# Patient Record
Sex: FEMALE | Race: WHITE | NOT HISPANIC OR LATINO | Employment: OTHER | URBAN - METROPOLITAN AREA
[De-identification: names, ages, dates, MRNs, and addresses within clinical notes are randomized per-mention and may not be internally consistent; named-entity substitution may affect disease eponyms.]

---

## 2017-05-16 ENCOUNTER — HOSPITAL ENCOUNTER (INPATIENT)
Facility: HOSPITAL | Age: 62
LOS: 3 days | DRG: 917 | End: 2017-05-19
Attending: EMERGENCY MEDICINE | Admitting: INTERNAL MEDICINE
Payer: MEDICARE

## 2017-05-16 DIAGNOSIS — T50.902A INTENTIONAL OVERDOSE OF DRUG IN TABLET FORM (HCC): Primary | ICD-10-CM

## 2017-05-16 DIAGNOSIS — R74.8 ELEVATED CK: ICD-10-CM

## 2017-05-16 DIAGNOSIS — R00.0 SINUS TACHYCARDIA: ICD-10-CM

## 2017-05-16 DIAGNOSIS — R41.82 ALTERED MENTAL STATUS: ICD-10-CM

## 2017-05-16 PROBLEM — E87.2 LACTIC ACIDOSIS: Status: ACTIVE | Noted: 2017-05-16

## 2017-05-16 PROBLEM — E87.20 LACTIC ACIDOSIS: Status: ACTIVE | Noted: 2017-05-16

## 2017-05-16 PROBLEM — F32.A DEPRESSION: Status: ACTIVE | Noted: 2017-05-16

## 2017-05-16 LAB
ALBUMIN SERPL BCP-MCNC: 3.9 G/DL (ref 3.5–5)
ALP SERPL-CCNC: 76 U/L (ref 46–116)
ALT SERPL W P-5'-P-CCNC: 32 U/L (ref 12–78)
AMMONIA PLAS-SCNC: 34 UMOL/L (ref 11–35)
ANION GAP SERPL CALCULATED.3IONS-SCNC: 8 MMOL/L (ref 4–13)
APAP SERPL-MCNC: <2 UG/ML (ref 10–30)
AST SERPL W P-5'-P-CCNC: 37 U/L (ref 5–45)
BASOPHILS # BLD AUTO: 0.01 THOUSANDS/ΜL (ref 0–0.1)
BASOPHILS NFR BLD AUTO: 0 % (ref 0–1)
BILIRUB SERPL-MCNC: 0.61 MG/DL (ref 0.2–1)
BUN SERPL-MCNC: 16 MG/DL (ref 5–25)
CALCIUM SERPL-MCNC: 9.6 MG/DL (ref 8.3–10.1)
CHLORIDE SERPL-SCNC: 101 MMOL/L (ref 100–108)
CK MB SERPL-MCNC: 16.8 NG/ML (ref 0–5)
CK MB SERPL-MCNC: 3.1 % (ref 0–2.5)
CK SERPL-CCNC: 538 U/L (ref 26–192)
CO2 SERPL-SCNC: 30 MMOL/L (ref 21–32)
CREAT SERPL-MCNC: 0.68 MG/DL (ref 0.6–1.3)
EOSINOPHIL # BLD AUTO: 0.01 THOUSAND/ΜL (ref 0–0.61)
EOSINOPHIL NFR BLD AUTO: 0 % (ref 0–6)
ERYTHROCYTE [DISTWIDTH] IN BLOOD BY AUTOMATED COUNT: 13 % (ref 11.6–15.1)
ETHANOL SERPL-MCNC: <3 MG/DL (ref 0–3)
GFR SERPL CREATININE-BSD FRML MDRD: >60 ML/MIN/1.73SQ M
GLUCOSE SERPL-MCNC: 115 MG/DL (ref 65–140)
HCT VFR BLD AUTO: 42.3 % (ref 34.8–46.1)
HGB BLD-MCNC: 14.5 G/DL (ref 11.5–15.4)
LACTATE SERPL-SCNC: 2.8 MMOL/L (ref 0.5–2)
LYMPHOCYTES # BLD AUTO: 0.76 THOUSANDS/ΜL (ref 0.6–4.47)
LYMPHOCYTES NFR BLD AUTO: 9 % (ref 14–44)
MCH RBC QN AUTO: 31.7 PG (ref 26.8–34.3)
MCHC RBC AUTO-ENTMCNC: 34.3 G/DL (ref 31.4–37.4)
MCV RBC AUTO: 93 FL (ref 82–98)
MONOCYTES # BLD AUTO: 0.78 THOUSAND/ΜL (ref 0.17–1.22)
MONOCYTES NFR BLD AUTO: 9 % (ref 4–12)
NEUTROPHILS # BLD AUTO: 7.19 THOUSANDS/ΜL (ref 1.85–7.62)
NEUTS SEG NFR BLD AUTO: 82 % (ref 43–75)
NRBC BLD AUTO-RTO: 0 /100 WBCS
PLATELET # BLD AUTO: 238 THOUSANDS/UL (ref 149–390)
PMV BLD AUTO: 11.8 FL (ref 8.9–12.7)
POTASSIUM SERPL-SCNC: 4.2 MMOL/L (ref 3.5–5.3)
PROT SERPL-MCNC: 7.2 G/DL (ref 6.4–8.2)
RBC # BLD AUTO: 4.57 MILLION/UL (ref 3.81–5.12)
SALICYLATES SERPL-MCNC: <3 MG/DL (ref 3–20)
SODIUM SERPL-SCNC: 139 MMOL/L (ref 136–145)
TSH SERPL DL<=0.05 MIU/L-ACNC: 5.72 UIU/ML (ref 0.36–3.74)
WBC # BLD AUTO: 8.77 THOUSAND/UL (ref 4.31–10.16)

## 2017-05-16 PROCEDURE — 82550 ASSAY OF CK (CPK): CPT | Performed by: EMERGENCY MEDICINE

## 2017-05-16 PROCEDURE — 80053 COMPREHEN METABOLIC PANEL: CPT | Performed by: EMERGENCY MEDICINE

## 2017-05-16 PROCEDURE — 84443 ASSAY THYROID STIM HORMONE: CPT | Performed by: EMERGENCY MEDICINE

## 2017-05-16 PROCEDURE — 96360 HYDRATION IV INFUSION INIT: CPT

## 2017-05-16 PROCEDURE — 82140 ASSAY OF AMMONIA: CPT | Performed by: EMERGENCY MEDICINE

## 2017-05-16 PROCEDURE — 80329 ANALGESICS NON-OPIOID 1 OR 2: CPT | Performed by: EMERGENCY MEDICINE

## 2017-05-16 PROCEDURE — 83605 ASSAY OF LACTIC ACID: CPT | Performed by: EMERGENCY MEDICINE

## 2017-05-16 PROCEDURE — 93005 ELECTROCARDIOGRAM TRACING: CPT

## 2017-05-16 PROCEDURE — 80320 DRUG SCREEN QUANTALCOHOLS: CPT | Performed by: EMERGENCY MEDICINE

## 2017-05-16 PROCEDURE — 82553 CREATINE MB FRACTION: CPT | Performed by: EMERGENCY MEDICINE

## 2017-05-16 PROCEDURE — 36415 COLL VENOUS BLD VENIPUNCTURE: CPT | Performed by: EMERGENCY MEDICINE

## 2017-05-16 PROCEDURE — 85025 COMPLETE CBC W/AUTO DIFF WBC: CPT | Performed by: EMERGENCY MEDICINE

## 2017-05-16 RX ORDER — TEMAZEPAM 15 MG/1
15 CAPSULE ORAL
COMMUNITY
End: 2017-06-01 | Stop reason: HOSPADM

## 2017-05-16 RX ORDER — TRAZODONE HYDROCHLORIDE 100 MG/1
100 TABLET ORAL
Status: ON HOLD | COMMUNITY
End: 2017-06-01

## 2017-05-16 RX ORDER — VENLAFAXINE 37.5 MG/1
37.5 TABLET ORAL 3 TIMES DAILY
COMMUNITY
End: 2017-06-01 | Stop reason: HOSPADM

## 2017-05-16 RX ORDER — ONDANSETRON 2 MG/ML
4 INJECTION INTRAMUSCULAR; INTRAVENOUS EVERY 6 HOURS PRN
Status: DISCONTINUED | OUTPATIENT
Start: 2017-05-16 | End: 2017-05-19 | Stop reason: HOSPADM

## 2017-05-16 RX ORDER — SODIUM CHLORIDE 9 MG/ML
100 INJECTION, SOLUTION INTRAVENOUS CONTINUOUS
Status: DISCONTINUED | OUTPATIENT
Start: 2017-05-16 | End: 2017-05-18

## 2017-05-16 RX ORDER — LEVOTHYROXINE SODIUM 0.05 MG/1
50 TABLET ORAL
Status: DISCONTINUED | OUTPATIENT
Start: 2017-05-17 | End: 2017-05-19 | Stop reason: HOSPADM

## 2017-05-16 RX ORDER — LORAZEPAM 0.5 MG/1
0.5 TABLET ORAL 2 TIMES DAILY PRN
COMMUNITY
End: 2017-06-01 | Stop reason: HOSPADM

## 2017-05-16 RX ORDER — LEVOTHYROXINE SODIUM 0.05 MG/1
50 TABLET ORAL DAILY
Status: ON HOLD | COMMUNITY
End: 2018-03-12

## 2017-05-16 RX ADMIN — SODIUM CHLORIDE 100 ML/HR: 0.9 INJECTION, SOLUTION INTRAVENOUS at 21:55

## 2017-05-16 RX ADMIN — SODIUM CHLORIDE 1000 ML: 0.9 INJECTION, SOLUTION INTRAVENOUS at 16:29

## 2017-05-17 LAB
AMPHETAMINES SERPL QL SCN: NEGATIVE
ANION GAP SERPL CALCULATED.3IONS-SCNC: 7 MMOL/L (ref 4–13)
ATRIAL RATE: 102 BPM
BARBITURATES UR QL: NEGATIVE
BENZODIAZ UR QL: POSITIVE
BUN SERPL-MCNC: 13 MG/DL (ref 5–25)
CALCIUM SERPL-MCNC: 8.2 MG/DL (ref 8.3–10.1)
CHLORIDE SERPL-SCNC: 107 MMOL/L (ref 100–108)
CO2 SERPL-SCNC: 29 MMOL/L (ref 21–32)
COCAINE UR QL: NEGATIVE
CREAT SERPL-MCNC: 0.54 MG/DL (ref 0.6–1.3)
ERYTHROCYTE [DISTWIDTH] IN BLOOD BY AUTOMATED COUNT: 12.9 % (ref 11.6–15.1)
GFR SERPL CREATININE-BSD FRML MDRD: >60 ML/MIN/1.73SQ M
GLUCOSE SERPL-MCNC: 80 MG/DL (ref 65–140)
HCT VFR BLD AUTO: 36.5 % (ref 34.8–46.1)
HGB BLD-MCNC: 12.2 G/DL (ref 11.5–15.4)
MCH RBC QN AUTO: 30.9 PG (ref 26.8–34.3)
MCHC RBC AUTO-ENTMCNC: 33.4 G/DL (ref 31.4–37.4)
MCV RBC AUTO: 92 FL (ref 82–98)
METHADONE UR QL: NEGATIVE
OPIATES UR QL SCN: NEGATIVE
P AXIS: 36 DEGREES
PCP UR QL: NEGATIVE
PLATELET # BLD AUTO: 183 THOUSANDS/UL (ref 149–390)
PMV BLD AUTO: 11.7 FL (ref 8.9–12.7)
POTASSIUM SERPL-SCNC: 3.5 MMOL/L (ref 3.5–5.3)
PR INTERVAL: 176 MS
QRS AXIS: 17 DEGREES
QRSD INTERVAL: 84 MS
QT INTERVAL: 310 MS
QTC INTERVAL: 404 MS
RBC # BLD AUTO: 3.95 MILLION/UL (ref 3.81–5.12)
SODIUM SERPL-SCNC: 143 MMOL/L (ref 136–145)
T WAVE AXIS: 55 DEGREES
THC UR QL: NEGATIVE
VENTRICULAR RATE: 102 BPM
WBC # BLD AUTO: 6.68 THOUSAND/UL (ref 4.31–10.16)

## 2017-05-17 PROCEDURE — 36415 COLL VENOUS BLD VENIPUNCTURE: CPT | Performed by: FAMILY MEDICINE

## 2017-05-17 PROCEDURE — 99285 EMERGENCY DEPT VISIT HI MDM: CPT

## 2017-05-17 PROCEDURE — 80307 DRUG TEST PRSMV CHEM ANLYZR: CPT | Performed by: EMERGENCY MEDICINE

## 2017-05-17 PROCEDURE — 85027 COMPLETE CBC AUTOMATED: CPT | Performed by: FAMILY MEDICINE

## 2017-05-17 PROCEDURE — 80048 BASIC METABOLIC PNL TOTAL CA: CPT | Performed by: FAMILY MEDICINE

## 2017-05-17 RX ADMIN — ENOXAPARIN SODIUM 40 MG: 40 INJECTION SUBCUTANEOUS at 09:27

## 2017-05-17 RX ADMIN — LEVOTHYROXINE SODIUM 50 MCG: 50 TABLET ORAL at 06:04

## 2017-05-18 PROBLEM — F29 PSYCHOSIS (HCC): Status: ACTIVE | Noted: 2017-05-18

## 2017-05-18 RX ORDER — VENLAFAXINE HYDROCHLORIDE 37.5 MG/1
37.5 CAPSULE, EXTENDED RELEASE ORAL 3 TIMES DAILY
Status: DISCONTINUED | OUTPATIENT
Start: 2017-05-18 | End: 2017-05-18

## 2017-05-18 RX ADMIN — LEVOTHYROXINE SODIUM 50 MCG: 50 TABLET ORAL at 07:00

## 2017-05-18 RX ADMIN — ENOXAPARIN SODIUM 40 MG: 40 INJECTION SUBCUTANEOUS at 10:10

## 2017-05-19 ENCOUNTER — HOSPITAL ENCOUNTER (INPATIENT)
Facility: HOSPITAL | Age: 62
LOS: 13 days | Discharge: HOME | DRG: 885 | End: 2017-06-01
Attending: PSYCHIATRY & NEUROLOGY | Admitting: PSYCHIATRY & NEUROLOGY
Payer: MEDICARE

## 2017-05-19 VITALS
WEIGHT: 156.31 LBS | HEART RATE: 73 BPM | OXYGEN SATURATION: 96 % | TEMPERATURE: 98.3 F | HEIGHT: 64 IN | BODY MASS INDEX: 26.69 KG/M2 | DIASTOLIC BLOOD PRESSURE: 60 MMHG | RESPIRATION RATE: 18 BRPM | SYSTOLIC BLOOD PRESSURE: 100 MMHG

## 2017-05-19 DIAGNOSIS — F25.1 SCHIZOAFFECTIVE DISORDER, DEPRESSIVE TYPE (HCC): Chronic | ICD-10-CM

## 2017-05-19 DIAGNOSIS — G47.00 INSOMNIA: Primary | ICD-10-CM

## 2017-05-19 RX ORDER — LORAZEPAM 2 MG/ML
1 INJECTION INTRAMUSCULAR EVERY 4 HOURS PRN
Status: DISCONTINUED | OUTPATIENT
Start: 2017-05-19 | End: 2017-06-01 | Stop reason: HOSPADM

## 2017-05-19 RX ORDER — HALOPERIDOL 1 MG/1
1 TABLET ORAL EVERY 8 HOURS PRN
Status: DISCONTINUED | OUTPATIENT
Start: 2017-05-19 | End: 2017-06-01 | Stop reason: HOSPADM

## 2017-05-19 RX ORDER — LORAZEPAM 0.5 MG/1
0.5 TABLET ORAL 2 TIMES DAILY PRN
Status: DISCONTINUED | OUTPATIENT
Start: 2017-05-19 | End: 2017-05-19

## 2017-05-19 RX ORDER — MAGNESIUM HYDROXIDE/ALUMINUM HYDROXICE/SIMETHICONE 120; 1200; 1200 MG/30ML; MG/30ML; MG/30ML
30 SUSPENSION ORAL EVERY 4 HOURS PRN
Status: DISCONTINUED | OUTPATIENT
Start: 2017-05-19 | End: 2017-06-01 | Stop reason: HOSPADM

## 2017-05-19 RX ORDER — LORAZEPAM 1 MG/1
1 TABLET ORAL EVERY 4 HOURS PRN
Status: DISCONTINUED | OUTPATIENT
Start: 2017-05-19 | End: 2017-06-01 | Stop reason: HOSPADM

## 2017-05-19 RX ORDER — TRAZODONE HYDROCHLORIDE 100 MG/1
100 TABLET ORAL
Status: DISCONTINUED | OUTPATIENT
Start: 2017-05-19 | End: 2017-06-01 | Stop reason: HOSPADM

## 2017-05-19 RX ORDER — TEMAZEPAM 15 MG/1
15 CAPSULE ORAL
Status: DISCONTINUED | OUTPATIENT
Start: 2017-05-19 | End: 2017-06-01 | Stop reason: HOSPADM

## 2017-05-19 RX ORDER — OLANZAPINE 10 MG/1
2.5 INJECTION, POWDER, LYOPHILIZED, FOR SOLUTION INTRAMUSCULAR EVERY 8 HOURS PRN
Status: DISCONTINUED | OUTPATIENT
Start: 2017-05-19 | End: 2017-06-01 | Stop reason: HOSPADM

## 2017-05-19 RX ORDER — VENLAFAXINE 37.5 MG/1
37.5 TABLET ORAL 3 TIMES DAILY
Status: DISCONTINUED | OUTPATIENT
Start: 2017-05-19 | End: 2017-05-23

## 2017-05-19 RX ORDER — LEVOTHYROXINE SODIUM 0.05 MG/1
50 TABLET ORAL
Status: DISCONTINUED | OUTPATIENT
Start: 2017-05-20 | End: 2017-06-01 | Stop reason: HOSPADM

## 2017-05-19 RX ORDER — HALOPERIDOL 5 MG/ML
1 INJECTION INTRAMUSCULAR EVERY 8 HOURS PRN
Status: DISCONTINUED | OUTPATIENT
Start: 2017-05-19 | End: 2017-06-01 | Stop reason: HOSPADM

## 2017-05-19 RX ORDER — OLANZAPINE 5 MG/1
5 TABLET ORAL EVERY 8 HOURS PRN
Status: DISCONTINUED | OUTPATIENT
Start: 2017-05-19 | End: 2017-06-01 | Stop reason: HOSPADM

## 2017-05-19 RX ORDER — RISPERIDONE 1 MG/1
1 TABLET, ORALLY DISINTEGRATING ORAL EVERY 8 HOURS PRN
Status: DISCONTINUED | OUTPATIENT
Start: 2017-05-19 | End: 2017-06-01 | Stop reason: HOSPADM

## 2017-05-19 RX ADMIN — LEVOTHYROXINE SODIUM 50 MCG: 50 TABLET ORAL at 05:37

## 2017-05-19 RX ADMIN — TRAZODONE HYDROCHLORIDE 100 MG: 100 TABLET ORAL at 22:05

## 2017-05-19 RX ADMIN — VENLAFAXINE 37.5 MG: 37.5 TABLET ORAL at 21:03

## 2017-05-20 PROBLEM — F32.3 MAJOR DEPRESSIVE DISORDER WITH PSYCHOTIC FEATURES (HCC): Status: ACTIVE | Noted: 2017-05-20

## 2017-05-20 RX ORDER — QUETIAPINE FUMARATE 100 MG/1
100 TABLET, FILM COATED ORAL 2 TIMES DAILY
Status: DISCONTINUED | OUTPATIENT
Start: 2017-05-20 | End: 2017-05-22

## 2017-05-20 RX ADMIN — VENLAFAXINE 37.5 MG: 37.5 TABLET ORAL at 22:09

## 2017-05-20 RX ADMIN — VENLAFAXINE 37.5 MG: 37.5 TABLET ORAL at 10:48

## 2017-05-20 RX ADMIN — TRAZODONE HYDROCHLORIDE 100 MG: 100 TABLET ORAL at 22:09

## 2017-05-20 RX ADMIN — QUETIAPINE FUMARATE 100 MG: 100 TABLET, FILM COATED ORAL at 18:23

## 2017-05-20 RX ADMIN — LEVOTHYROXINE SODIUM 50 MCG: 50 TABLET ORAL at 06:38

## 2017-05-20 RX ADMIN — VENLAFAXINE 37.5 MG: 37.5 TABLET ORAL at 18:14

## 2017-05-21 RX ADMIN — QUETIAPINE FUMARATE 100 MG: 100 TABLET, FILM COATED ORAL at 08:53

## 2017-05-21 RX ADMIN — TRAZODONE HYDROCHLORIDE 100 MG: 100 TABLET ORAL at 21:17

## 2017-05-21 RX ADMIN — VENLAFAXINE 37.5 MG: 37.5 TABLET ORAL at 08:54

## 2017-05-21 RX ADMIN — QUETIAPINE FUMARATE 100 MG: 100 TABLET, FILM COATED ORAL at 17:21

## 2017-05-21 RX ADMIN — LEVOTHYROXINE SODIUM 50 MCG: 50 TABLET ORAL at 06:26

## 2017-05-21 RX ADMIN — VENLAFAXINE 37.5 MG: 37.5 TABLET ORAL at 21:16

## 2017-05-21 RX ADMIN — VENLAFAXINE 37.5 MG: 37.5 TABLET ORAL at 17:21

## 2017-05-22 RX ORDER — OLANZAPINE 10 MG/1
10 TABLET ORAL
Status: DISCONTINUED | OUTPATIENT
Start: 2017-05-22 | End: 2017-05-23

## 2017-05-22 RX ADMIN — VENLAFAXINE 37.5 MG: 37.5 TABLET ORAL at 16:52

## 2017-05-22 RX ADMIN — TRAZODONE HYDROCHLORIDE 100 MG: 100 TABLET ORAL at 21:02

## 2017-05-22 RX ADMIN — VENLAFAXINE 37.5 MG: 37.5 TABLET ORAL at 21:02

## 2017-05-22 RX ADMIN — LEVOTHYROXINE SODIUM 50 MCG: 50 TABLET ORAL at 05:49

## 2017-05-22 RX ADMIN — QUETIAPINE FUMARATE 100 MG: 100 TABLET, FILM COATED ORAL at 08:16

## 2017-05-22 RX ADMIN — VENLAFAXINE 37.5 MG: 37.5 TABLET ORAL at 08:16

## 2017-05-22 RX ADMIN — OLANZAPINE 10 MG: 10 TABLET, FILM COATED ORAL at 21:02

## 2017-05-23 PROBLEM — F20.0 SCHIZOPHRENIA, PARANOID, CHRONIC WITH ACUTE EXACERBATION (HCC): Chronic | Status: ACTIVE | Noted: 2017-05-20

## 2017-05-23 RX ORDER — VENLAFAXINE 37.5 MG/1
37.5 TABLET ORAL 2 TIMES DAILY WITH MEALS
Status: DISCONTINUED | OUTPATIENT
Start: 2017-05-23 | End: 2017-05-24

## 2017-05-23 RX ADMIN — LORAZEPAM 1 MG: 1 TABLET ORAL at 11:03

## 2017-05-23 RX ADMIN — VENLAFAXINE 37.5 MG: 37.5 TABLET ORAL at 08:57

## 2017-05-23 RX ADMIN — VENLAFAXINE 37.5 MG: 37.5 TABLET ORAL at 15:37

## 2017-05-23 RX ADMIN — LEVOTHYROXINE SODIUM 50 MCG: 50 TABLET ORAL at 05:51

## 2017-05-23 RX ADMIN — TRAZODONE HYDROCHLORIDE 100 MG: 100 TABLET ORAL at 21:29

## 2017-05-23 RX ADMIN — OLANZAPINE 15 MG: 10 TABLET, FILM COATED ORAL at 21:30

## 2017-05-24 RX ORDER — OLANZAPINE 10 MG/1
20 TABLET ORAL
Status: DISCONTINUED | OUTPATIENT
Start: 2017-05-24 | End: 2017-05-25

## 2017-05-24 RX ADMIN — LEVOTHYROXINE SODIUM 50 MCG: 50 TABLET ORAL at 06:02

## 2017-05-24 RX ADMIN — VENLAFAXINE 37.5 MG: 37.5 TABLET ORAL at 08:40

## 2017-05-24 RX ADMIN — OLANZAPINE 20 MG: 10 TABLET, FILM COATED ORAL at 21:36

## 2017-05-24 RX ADMIN — TRAZODONE HYDROCHLORIDE 100 MG: 100 TABLET ORAL at 21:36

## 2017-05-25 RX ORDER — OLANZAPINE 10 MG/1
10 TABLET ORAL 2 TIMES DAILY
Status: DISCONTINUED | OUTPATIENT
Start: 2017-05-25 | End: 2017-05-30

## 2017-05-25 RX ADMIN — LORAZEPAM 1 MG: 1 TABLET ORAL at 12:03

## 2017-05-25 RX ADMIN — LEVOTHYROXINE SODIUM 50 MCG: 50 TABLET ORAL at 06:08

## 2017-05-25 RX ADMIN — OLANZAPINE 10 MG: 10 TABLET, FILM COATED ORAL at 17:19

## 2017-05-25 RX ADMIN — TRAZODONE HYDROCHLORIDE 100 MG: 100 TABLET ORAL at 21:21

## 2017-05-26 PROBLEM — F32.89 ATYPICAL DEPRESSION: Status: ACTIVE | Noted: 2017-05-26

## 2017-05-26 RX ADMIN — OLANZAPINE 10 MG: 10 TABLET, FILM COATED ORAL at 08:42

## 2017-05-26 RX ADMIN — TRAZODONE HYDROCHLORIDE 100 MG: 100 TABLET ORAL at 21:28

## 2017-05-26 RX ADMIN — LORAZEPAM 1 MG: 1 TABLET ORAL at 19:46

## 2017-05-26 RX ADMIN — LEVOTHYROXINE SODIUM 50 MCG: 50 TABLET ORAL at 06:08

## 2017-05-26 RX ADMIN — OLANZAPINE 10 MG: 10 TABLET, FILM COATED ORAL at 17:07

## 2017-05-27 LAB — GLUCOSE SERPL-MCNC: 120 MG/DL (ref 65–140)

## 2017-05-27 PROCEDURE — 82948 REAGENT STRIP/BLOOD GLUCOSE: CPT

## 2017-05-27 RX ORDER — SERTRALINE HYDROCHLORIDE 100 MG/1
100 TABLET, FILM COATED ORAL DAILY
Status: DISCONTINUED | OUTPATIENT
Start: 2017-05-28 | End: 2017-06-01 | Stop reason: HOSPADM

## 2017-05-27 RX ADMIN — LEVOTHYROXINE SODIUM 50 MCG: 50 TABLET ORAL at 05:51

## 2017-05-27 RX ADMIN — OLANZAPINE 10 MG: 10 TABLET, FILM COATED ORAL at 08:39

## 2017-05-27 RX ADMIN — TRAZODONE HYDROCHLORIDE 100 MG: 100 TABLET ORAL at 21:23

## 2017-05-27 RX ADMIN — OLANZAPINE 10 MG: 10 TABLET, FILM COATED ORAL at 17:29

## 2017-05-27 RX ADMIN — SERTRALINE HYDROCHLORIDE 50 MG: 50 TABLET ORAL at 08:39

## 2017-05-28 LAB
CHOLEST SERPL-MCNC: 189 MG/DL (ref 50–200)
HDLC SERPL-MCNC: 41 MG/DL (ref 40–60)
LDLC SERPL CALC-MCNC: 118 MG/DL (ref 0–100)
TRIGL SERPL-MCNC: 152 MG/DL

## 2017-05-28 PROCEDURE — 80061 LIPID PANEL: CPT | Performed by: PSYCHIATRY & NEUROLOGY

## 2017-05-28 RX ADMIN — OLANZAPINE 10 MG: 10 TABLET, FILM COATED ORAL at 08:13

## 2017-05-28 RX ADMIN — SERTRALINE HYDROCHLORIDE 100 MG: 100 TABLET, FILM COATED ORAL at 08:13

## 2017-05-28 RX ADMIN — TRAZODONE HYDROCHLORIDE 100 MG: 100 TABLET ORAL at 21:12

## 2017-05-28 RX ADMIN — LEVOTHYROXINE SODIUM 50 MCG: 50 TABLET ORAL at 06:29

## 2017-05-28 RX ADMIN — OLANZAPINE 10 MG: 10 TABLET, FILM COATED ORAL at 17:09

## 2017-05-29 PROBLEM — F25.1 SCHIZOAFFECTIVE DISORDER, DEPRESSIVE TYPE (HCC): Chronic | Status: ACTIVE | Noted: 2017-05-20

## 2017-05-29 PROBLEM — F32.89 ATYPICAL DEPRESSION: Status: RESOLVED | Noted: 2017-05-26 | Resolved: 2017-05-29

## 2017-05-29 RX ADMIN — SERTRALINE HYDROCHLORIDE 100 MG: 100 TABLET, FILM COATED ORAL at 08:50

## 2017-05-29 RX ADMIN — OLANZAPINE 10 MG: 10 TABLET, FILM COATED ORAL at 17:48

## 2017-05-29 RX ADMIN — TEMAZEPAM 15 MG: 15 CAPSULE ORAL at 02:16

## 2017-05-29 RX ADMIN — TRAZODONE HYDROCHLORIDE 100 MG: 100 TABLET ORAL at 21:26

## 2017-05-29 RX ADMIN — LEVOTHYROXINE SODIUM 50 MCG: 50 TABLET ORAL at 06:12

## 2017-05-29 RX ADMIN — OLANZAPINE 10 MG: 10 TABLET, FILM COATED ORAL at 08:50

## 2017-05-30 RX ORDER — RISPERIDONE 0.25 MG/1
0.5 TABLET, FILM COATED ORAL 2 TIMES DAILY
Status: DISCONTINUED | OUTPATIENT
Start: 2017-05-30 | End: 2017-06-01 | Stop reason: HOSPADM

## 2017-05-30 RX ADMIN — SERTRALINE HYDROCHLORIDE 100 MG: 100 TABLET, FILM COATED ORAL at 08:29

## 2017-05-30 RX ADMIN — RISPERIDONE 0.5 MG: 0.25 TABLET ORAL at 17:17

## 2017-05-30 RX ADMIN — OLANZAPINE 10 MG: 10 TABLET, FILM COATED ORAL at 08:29

## 2017-05-30 RX ADMIN — TRAZODONE HYDROCHLORIDE 100 MG: 100 TABLET ORAL at 21:26

## 2017-05-30 RX ADMIN — LEVOTHYROXINE SODIUM 50 MCG: 50 TABLET ORAL at 06:04

## 2017-05-31 RX ADMIN — TRAZODONE HYDROCHLORIDE 100 MG: 100 TABLET ORAL at 21:24

## 2017-05-31 RX ADMIN — SERTRALINE HYDROCHLORIDE 100 MG: 100 TABLET, FILM COATED ORAL at 08:08

## 2017-05-31 RX ADMIN — RISPERIDONE 0.5 MG: 0.25 TABLET ORAL at 08:08

## 2017-05-31 RX ADMIN — RISPERIDONE 0.5 MG: 0.25 TABLET ORAL at 17:19

## 2017-05-31 RX ADMIN — LEVOTHYROXINE SODIUM 50 MCG: 50 TABLET ORAL at 06:04

## 2017-06-01 VITALS
HEART RATE: 79 BPM | OXYGEN SATURATION: 95 % | BODY MASS INDEX: 26.98 KG/M2 | TEMPERATURE: 98.3 F | WEIGHT: 158 LBS | SYSTOLIC BLOOD PRESSURE: 105 MMHG | DIASTOLIC BLOOD PRESSURE: 55 MMHG | RESPIRATION RATE: 16 BRPM | HEIGHT: 64 IN

## 2017-06-01 RX ORDER — TRAZODONE HYDROCHLORIDE 100 MG/1
100 TABLET ORAL
Qty: 30 TABLET | Refills: 0 | Status: ON HOLD | OUTPATIENT
Start: 2017-06-01 | End: 2018-03-12

## 2017-06-01 RX ORDER — SERTRALINE HYDROCHLORIDE 100 MG/1
100 TABLET, FILM COATED ORAL DAILY
Qty: 30 TABLET | Refills: 0 | Status: ON HOLD | OUTPATIENT
Start: 2017-06-01 | End: 2018-03-12

## 2017-06-01 RX ORDER — RISPERIDONE 0.5 MG/1
0.5 TABLET, FILM COATED ORAL 2 TIMES DAILY
Qty: 60 TABLET | Refills: 0 | Status: SHIPPED | OUTPATIENT
Start: 2017-06-01 | End: 2018-03-19 | Stop reason: HOSPADM

## 2017-06-01 RX ADMIN — RISPERIDONE 0.5 MG: 0.25 TABLET ORAL at 08:34

## 2017-06-01 RX ADMIN — SERTRALINE HYDROCHLORIDE 100 MG: 100 TABLET, FILM COATED ORAL at 08:34

## 2017-06-01 RX ADMIN — LEVOTHYROXINE SODIUM 50 MCG: 50 TABLET ORAL at 05:47

## 2017-06-01 RX ADMIN — RISPERIDONE 0.5 MG: 0.25 TABLET ORAL at 17:13

## 2018-03-04 ENCOUNTER — HOSPITAL ENCOUNTER (INPATIENT)
Facility: HOSPITAL | Age: 63
LOS: 15 days | Discharge: HOME | DRG: 885 | End: 2018-03-19
Attending: EMERGENCY MEDICINE | Admitting: PSYCHIATRY & NEUROLOGY
Payer: MEDICARE

## 2018-03-04 DIAGNOSIS — E03.9 HYPOTHYROID: ICD-10-CM

## 2018-03-04 DIAGNOSIS — F25.1 SCHIZOAFFECTIVE DISORDER, DEPRESSIVE TYPE (HCC): Chronic | ICD-10-CM

## 2018-03-04 DIAGNOSIS — R52 PAIN: ICD-10-CM

## 2018-03-04 DIAGNOSIS — E87.6 HYPOKALEMIA: ICD-10-CM

## 2018-03-04 DIAGNOSIS — F29 PSYCHOSIS (HCC): Primary | ICD-10-CM

## 2018-03-04 DIAGNOSIS — G47.00 INSOMNIA: ICD-10-CM

## 2018-03-04 DIAGNOSIS — F20.9 SCHIZOPHRENIA (HCC): ICD-10-CM

## 2018-03-04 LAB
ALBUMIN SERPL BCP-MCNC: 3.6 G/DL (ref 3.5–5)
ALP SERPL-CCNC: 60 U/L (ref 46–116)
ALT SERPL W P-5'-P-CCNC: 15 U/L (ref 12–78)
AMPHETAMINES SERPL QL SCN: NEGATIVE
ANION GAP SERPL CALCULATED.3IONS-SCNC: 6 MMOL/L (ref 4–13)
AST SERPL W P-5'-P-CCNC: 14 U/L (ref 5–45)
ATRIAL RATE: 92 BPM
BACTERIA UR QL AUTO: ABNORMAL /HPF
BARBITURATES UR QL: NEGATIVE
BASOPHILS # BLD AUTO: 0.08 THOUSANDS/ΜL (ref 0–0.1)
BASOPHILS NFR BLD AUTO: 1 % (ref 0–1)
BENZODIAZ UR QL: NEGATIVE
BILIRUB SERPL-MCNC: 0.63 MG/DL (ref 0.2–1)
BILIRUB UR QL STRIP: NEGATIVE
BILIRUB UR QL STRIP: NEGATIVE
BUN SERPL-MCNC: 5 MG/DL (ref 5–25)
CALCIUM SERPL-MCNC: 8.8 MG/DL (ref 8.3–10.1)
CHLORIDE SERPL-SCNC: 102 MMOL/L (ref 100–108)
CLARITY UR: CLEAR
CLARITY UR: CLEAR
CO2 SERPL-SCNC: 32 MMOL/L (ref 21–32)
COCAINE UR QL: NEGATIVE
COLOR UR: YELLOW
COLOR UR: YELLOW
CREAT SERPL-MCNC: 0.75 MG/DL (ref 0.6–1.3)
EOSINOPHIL # BLD AUTO: 0.09 THOUSAND/ΜL (ref 0–0.61)
EOSINOPHIL NFR BLD AUTO: 1 % (ref 0–6)
ERYTHROCYTE [DISTWIDTH] IN BLOOD BY AUTOMATED COUNT: 12.7 % (ref 11.6–15.1)
ETHANOL SERPL-MCNC: <3 MG/DL (ref 0–3)
GFR SERPL CREATININE-BSD FRML MDRD: 86 ML/MIN/1.73SQ M
GLUCOSE SERPL-MCNC: 111 MG/DL (ref 65–140)
GLUCOSE UR STRIP-MCNC: NEGATIVE MG/DL
GLUCOSE UR STRIP-MCNC: NEGATIVE MG/DL
HCT VFR BLD AUTO: 43.2 % (ref 34.8–46.1)
HGB BLD-MCNC: 15.2 G/DL (ref 11.5–15.4)
HGB UR QL STRIP.AUTO: NEGATIVE
HGB UR QL STRIP.AUTO: NEGATIVE
KETONES UR STRIP-MCNC: NEGATIVE MG/DL
KETONES UR STRIP-MCNC: NEGATIVE MG/DL
LEUKOCYTE ESTERASE UR QL STRIP: ABNORMAL
LEUKOCYTE ESTERASE UR QL STRIP: ABNORMAL
LYMPHOCYTES # BLD AUTO: 1.92 THOUSANDS/ΜL (ref 0.6–4.47)
LYMPHOCYTES NFR BLD AUTO: 30 % (ref 14–44)
MCH RBC QN AUTO: 31.4 PG (ref 26.8–34.3)
MCHC RBC AUTO-ENTMCNC: 35.2 G/DL (ref 31.4–37.4)
MCV RBC AUTO: 89 FL (ref 82–98)
METHADONE UR QL: NEGATIVE
MONOCYTES # BLD AUTO: 0.67 THOUSAND/ΜL (ref 0.17–1.22)
MONOCYTES NFR BLD AUTO: 10 % (ref 4–12)
NEUTROPHILS # BLD AUTO: 3.7 THOUSANDS/ΜL (ref 1.85–7.62)
NEUTS SEG NFR BLD AUTO: 58 % (ref 43–75)
NITRITE UR QL STRIP: NEGATIVE
NITRITE UR QL STRIP: NEGATIVE
NON-SQ EPI CELLS URNS QL MICRO: ABNORMAL /HPF
NRBC BLD AUTO-RTO: 0 /100 WBCS
OPIATES UR QL SCN: NEGATIVE
P AXIS: 74 DEGREES
PCP UR QL: NEGATIVE
PH UR STRIP.AUTO: 6 [PH] (ref 4.5–8)
PH UR STRIP.AUTO: 6 [PH] (ref 4.5–8)
PLATELET # BLD AUTO: 290 THOUSANDS/UL (ref 149–390)
PMV BLD AUTO: 11.4 FL (ref 8.9–12.7)
POTASSIUM SERPL-SCNC: 2.9 MMOL/L (ref 3.5–5.3)
PR INTERVAL: 182 MS
PROT SERPL-MCNC: 7.4 G/DL (ref 6.4–8.2)
PROT UR STRIP-MCNC: NEGATIVE MG/DL
PROT UR STRIP-MCNC: NEGATIVE MG/DL
QRS AXIS: 6 DEGREES
QRSD INTERVAL: 80 MS
QT INTERVAL: 352 MS
QTC INTERVAL: 435 MS
RBC # BLD AUTO: 4.84 MILLION/UL (ref 3.81–5.12)
RBC #/AREA URNS AUTO: ABNORMAL /HPF
SODIUM SERPL-SCNC: 140 MMOL/L (ref 136–145)
SP GR UR STRIP.AUTO: 1 (ref 1–1.03)
SP GR UR STRIP.AUTO: <=1.005 (ref 1–1.03)
T WAVE AXIS: 64 DEGREES
THC UR QL: NEGATIVE
TSH SERPL DL<=0.05 MIU/L-ACNC: 4.09 UIU/ML (ref 0.36–3.74)
UROBILINOGEN UR QL STRIP.AUTO: 0.2 E.U./DL
UROBILINOGEN UR QL STRIP.AUTO: 1 E.U./DL
VENTRICULAR RATE: 92 BPM
WBC # BLD AUTO: 6.47 THOUSAND/UL (ref 4.31–10.16)
WBC #/AREA URNS AUTO: ABNORMAL /HPF

## 2018-03-04 PROCEDURE — 80053 COMPREHEN METABOLIC PANEL: CPT | Performed by: EMERGENCY MEDICINE

## 2018-03-04 PROCEDURE — 81001 URINALYSIS AUTO W/SCOPE: CPT | Performed by: EMERGENCY MEDICINE

## 2018-03-04 PROCEDURE — 80307 DRUG TEST PRSMV CHEM ANLYZR: CPT | Performed by: EMERGENCY MEDICINE

## 2018-03-04 PROCEDURE — 93005 ELECTROCARDIOGRAM TRACING: CPT

## 2018-03-04 PROCEDURE — 99285 EMERGENCY DEPT VISIT HI MDM: CPT

## 2018-03-04 PROCEDURE — 36415 COLL VENOUS BLD VENIPUNCTURE: CPT | Performed by: EMERGENCY MEDICINE

## 2018-03-04 PROCEDURE — 84443 ASSAY THYROID STIM HORMONE: CPT | Performed by: EMERGENCY MEDICINE

## 2018-03-04 PROCEDURE — 80320 DRUG SCREEN QUANTALCOHOLS: CPT | Performed by: EMERGENCY MEDICINE

## 2018-03-04 PROCEDURE — 85025 COMPLETE CBC W/AUTO DIFF WBC: CPT | Performed by: EMERGENCY MEDICINE

## 2018-03-04 RX ORDER — POTASSIUM CHLORIDE 20 MEQ/1
40 TABLET, EXTENDED RELEASE ORAL ONCE
Status: COMPLETED | OUTPATIENT
Start: 2018-03-04 | End: 2018-03-04

## 2018-03-04 RX ORDER — POTASSIUM CHLORIDE 750 MG/1
20 TABLET, EXTENDED RELEASE ORAL 2 TIMES DAILY
Qty: 28 TABLET | Refills: 0 | Status: SHIPPED | OUTPATIENT
Start: 2018-03-04 | End: 2018-03-11

## 2018-03-04 RX ORDER — HALOPERIDOL 5 MG
5 TABLET ORAL EVERY 8 HOURS PRN
Status: DISCONTINUED | OUTPATIENT
Start: 2018-03-04 | End: 2018-03-19 | Stop reason: HOSPADM

## 2018-03-04 RX ORDER — IBUPROFEN 600 MG/1
600 TABLET ORAL EVERY 8 HOURS PRN
Status: DISCONTINUED | OUTPATIENT
Start: 2018-03-04 | End: 2018-03-19 | Stop reason: HOSPADM

## 2018-03-04 RX ORDER — TRAZODONE HYDROCHLORIDE 50 MG/1
50 TABLET ORAL
Status: DISCONTINUED | OUTPATIENT
Start: 2018-03-04 | End: 2018-03-19 | Stop reason: HOSPADM

## 2018-03-04 RX ORDER — MAGNESIUM HYDROXIDE/ALUMINUM HYDROXICE/SIMETHICONE 120; 1200; 1200 MG/30ML; MG/30ML; MG/30ML
30 SUSPENSION ORAL EVERY 4 HOURS PRN
Status: DISCONTINUED | OUTPATIENT
Start: 2018-03-04 | End: 2018-03-19 | Stop reason: HOSPADM

## 2018-03-04 RX ORDER — POTASSIUM CHLORIDE 20 MEQ/1
40 TABLET, EXTENDED RELEASE ORAL DAILY
Status: DISCONTINUED | OUTPATIENT
Start: 2018-03-05 | End: 2018-03-19 | Stop reason: HOSPADM

## 2018-03-04 RX ORDER — LORAZEPAM 1 MG/1
1 TABLET ORAL EVERY 8 HOURS PRN
Status: DISCONTINUED | OUTPATIENT
Start: 2018-03-04 | End: 2018-03-19 | Stop reason: HOSPADM

## 2018-03-04 RX ORDER — HALOPERIDOL 5 MG/ML
5 INJECTION INTRAMUSCULAR EVERY 6 HOURS PRN
Status: DISCONTINUED | OUTPATIENT
Start: 2018-03-04 | End: 2018-03-19 | Stop reason: HOSPADM

## 2018-03-04 RX ORDER — IBUPROFEN 400 MG/1
800 TABLET ORAL EVERY 8 HOURS PRN
Status: DISCONTINUED | OUTPATIENT
Start: 2018-03-04 | End: 2018-03-19 | Stop reason: HOSPADM

## 2018-03-04 RX ADMIN — POTASSIUM CHLORIDE 40 MEQ: 1500 TABLET, EXTENDED RELEASE ORAL at 15:21

## 2018-03-04 NOTE — ED ATTENDING ATTESTATION
Brendon Johns DO, saw and evaluated the patient  I have discussed the patient with the resident/non-physician practitioner and agree with the resident's/non-physician practitioner's findings, Plan of Care, and MDM as documented in the resident's/non-physician practitioner's note, except where noted  All available labs and Radiology studies were reviewed  At this point I agree with the current assessment done in the Emergency Department  I have conducted an independent evaluation of this patient including a focused history and a physical exam     ED Note - Catarina Villalba 65 y o  female MRN: [id number]  Unit/Bed#: ED 07 Encounter: [id number]    History of Present Illness   HPI  Catarina Villalba is a 65 y o  female who presents for evaluation of auditory hallucinations and non-compliance with medications  Hx of schizophrenia  +ve SI due to hallucinations- does not specify whether command  No other complaints  REVIEW OF SYSTEMS  See HPI for further details  12 systems reviewed and otherwise negative except as noted  Historical Information     PAST MEDICAL HISTORY  Past Medical History:   Diagnosis Date    Anxiety     Disease of thyroid gland     Suicide attempt        FAMILY HISTORY  History reviewed  No pertinent family history  SOCIAL HISTORY  Social History     Social History    Marital status:      Spouse name: N/A    Number of children: N/A    Years of education: N/A     Social History Main Topics    Smoking status: Never Smoker    Smokeless tobacco: Never Used      Comment: pt denies smoking    Alcohol use No    Drug use: No    Sexual activity: No     Other Topics Concern    None     Social History Narrative    None       SURGICAL HISTORY  History reviewed  No pertinent surgical history    Meds/Allergies     CURRENT MEDICATIONS    Current Facility-Administered Medications:     potassium chloride (K-DUR,KLOR-CON) CR tablet 40 mEq, 40 mEq, Oral, Once, Jimbo Valdez MD    Current Outpatient Prescriptions:     levothyroxine 50 mcg tablet, Take 50 mcg by mouth daily, Disp: , Rfl:     risperiDONE (RisperDAL) 0 5 mg tablet, Take 1 tablet by mouth 2 (two) times a day, Disp: 60 tablet, Rfl: 0    sertraline (ZOLOFT) 100 mg tablet, Take 1 tablet by mouth daily, Disp: 30 tablet, Rfl: 0    traZODone (DESYREL) 100 mg tablet, Take 1 tablet by mouth daily at bedtime, Disp: 30 tablet, Rfl: 0    potassium chloride (K-DUR,KLOR-CON) 10 mEq tablet, Take 2 tablets (20 mEq total) by mouth 2 (two) times a day for 7 days, Disp: 28 tablet, Rfl: 0    (Not in a hospital admission)    ALLERGIES  Allergies   Allergen Reactions    Codeine     Sulfa Antibiotics     Tylenol [Acetaminophen]      Objective     PHYSICAL EXAM    VITAL SIGNS: Blood pressure 134/76, pulse (!) 109, temperature 98 1 °F (36 7 °C), temperature source Tympanic, resp  rate 18, weight 63 5 kg (140 lb), SpO2 98 %      Constitutional:  Appears well developed and well nourished, no acute distress, non-toxic appearance   Eyes:  PERRL, EOMI, conjunctivae pink, sclerae non-icteric, no nystagmus   HENT:  Normocephalic/Atraumatic, no rhinorrhea, mucous membranes moist   Neck: normal range of motion, no tenderness, supple   Respiratory:  No respiratory distress, normal breath sounds   Cardiovascular:  Normal rate, normal rhythm, no murmurs    GI:  Soft, non-tender, non-distended   :  No CVAT, no flank ecchymosis  Musculoskeletal:  No swelling or edema, no tenderness, no deformities  Integument:  Pink, warm, dry, Well hydrated, no rash, no erythema, no bullae   Lymphatic:  No cervical/ tonsillar/ submandibular lymphadenopathy noted   Neurologic:  Awake, Alert & oriented x 3, CN 2-12 intact, no focal neurological deficits, motor function intact  Psychiatric:  Speech and behavior appropriate       ED COURSE and MDM:    Assessment/Plan   Assessment:  Niurka Gray is a 65 y o  female presents for evaluation of auditory hallucinations and non-compliance with medications  Hx of schizophrenia  +ve SI due to hallucinations- does not specify whether command  No other complaints  Plan:  Labs, medical screening, imaging prn, EKG, crisis evaluation, disposition as appropriate- 201 vs  302  Portions of the record may have been created with voice recognition software  Occasional wrong word or "sound a like" substitutions may have occurred due to the inherent limitations of voice recognition software       ED Provider  Electronically Signed by

## 2018-03-04 NOTE — ED NOTES
Report phoned to [facility] , pt eating dinner , will transport when finished     Harman Vega, SALUD  03/04/18 6759

## 2018-03-04 NOTE — LETTER
[facility]  Dept: [address] TRANSFER CONSENT    NAME Chantel Notice                                                                       MRN [id number]    I have been informed of my rights regarding examination, treatment, and transfer   by Dr La Mccormack,     Benefits:      Risks:        Consent for Transfer:  I acknowledge that my medical condition has been evaluated and explained to me by the emergency department physician or other qualified medical person and/or my attending physician, who has recommended that I be transferred to the service of  Accepting Physician: Dr Tennille Garcia at [address] Name, [address] : [facility]  The above potential benefits of such transfer, the potential risks associated with such transfer, and the probable risks of not being transferred have been explained to me, and I fully understand them  The doctor has explained that, in my case, the benefits of transfer outweigh the risks  I agree to be transferred  I authorize the performance of emergency medical procedures and treatments upon me in both transit and upon arrival at the receiving facility  Additionally, I authorize the release of any and all medical records to the receiving facility and request they be transported with me, if possible  I understand that the safest mode of transportation during a medical emergency is an ambulance and that the Hospital advocates the use of this mode of transport  Risks of traveling to the receiving facility by car, including absence of medical control, life sustaining equipment, such as oxygen, and medical personnel has been explained to me and I fully understand them  (ANKIT CORRECT BOX BELOW)  [ x ]  I consent to the stated transfer and to be transported by ambulance/helicopter    [  ]  I consent to the stated transfer, but refuse transportation by ambulance and accept full responsibility for my transportation by car  I understand the risks of non-ambulance transfers and I exonerate the Hospital and its staff from any deterioration in my condition that results from this refusal     X___________________________________________    DATE  18  TIME________  Signature of patient or legally responsible individual signing on patient behalf           RELATIONSHIP TO PATIENT_________________________          Provider Certification    NAME Patrica Wade                                         1955                              MRN [id number]    A medical screening exam was performed on the above named patient  Based on the examination:    Condition Necessitating Transfer The primary encounter diagnosis was Psychosis  Diagnoses of Schizophrenia ([facility] [facility] ) and Hypokalemia were also pertinent to this visit  Patient Condition:      Reason for Transfer:      Transfer Requirements: Facility 5   · Space available and qualified personnel available for treatment as acknowledged by    · Agreed to accept transfer and to provide appropriate medical treatment as acknowledged by       Dr Pratima Winston  · Appropriate medical records of the examination and treatment of the patient are provided at the time of transfer   [address], [address] _______  · Transfer will be performed by qualified personnel from    and appropriate transfer equipment as required, including the use of necessary and appropriate life support measures      Provider Certification: I have examined the patient and explained the following risks and benefits of being transferred/refusing transfer to the patient/family:         Based on these reasonable risks and benefits to the patient and/or the unborn child(raphael), and based upon the information available at the time of the patients examination, I certify that the medical benefits reasonably to be expected from the provision of appropriate medical treatments at another medical facility outweigh the increasing risks, if any, to the individuals medical condition, and in the case of labor to the unborn child, from effecting the transfer      X____________________________________________ DATE 03/04/18        TIME_______      ORIGINAL - SEND TO MEDICAL RECORDS   COPY - SEND WITH PATIENT DURING TRANSFER

## 2018-03-04 NOTE — ED PROVIDER NOTES
History  Chief Complaint   Patient presents with    Psychiatric Evaluation     Pt has been hearing voices that "hurt her body"  Pt states that she wants to be dead so that she doesnt hear them  Pt states that they are playing with her locks, and eletric  Family states that pt took herself off of medication because it was not helping     This is a 65 y o  old female who presents to the ED for evaluation of psych evaluation  She has history of schizophrenia  Recent med changes  Now hearing voices  Gotten worse since change  At baseline, she always hear voices  Now wants someone to come kill her because she cannot take the voices anymore  Otherwise, patient denies fevers, chills, night sweats, cough, congestion, rhinorrhea, CP, dyspnea, abdominal pain, nausea, vomiting, diarrhea, constipation, urinary symptoms, leg pain or swelling  Prior to Admission Medications   Prescriptions Last Dose Informant Patient Reported? Taking?   levothyroxine 50 mcg tablet 3/4/2018 at Unknown time  Yes Yes   Sig: Take 50 mcg by mouth daily   risperiDONE (RisperDAL) 0 5 mg tablet Past Month at Unknown time  No Yes   Sig: Take 1 tablet by mouth 2 (two) times a day   sertraline (ZOLOFT) 100 mg tablet Past Month at Unknown time  No Yes   Sig: Take 1 tablet by mouth daily   traZODone (DESYREL) 100 mg tablet 3/3/2018 at Unknown time  No Yes   Sig: Take 1 tablet by mouth daily at bedtime      Facility-Administered Medications: None     Past Medical History:   Diagnosis Date    Anxiety     [facility]     Dementia     Disease of thyroid gland     Psychiatric illness     Psychosis     Schizoaffective disorder ([facility] )     Suicide attempt      History reviewed  No pertinent surgical history  History reviewed  No pertinent family history  I have reviewed and agree with the history as documented      Social History   Substance Use Topics    Smoking status: Never Smoker    Smokeless tobacco: Never Used      Comment: pt denies smoking    Alcohol use No      Review of Systems   Constitutional: Negative for chills, fatigue, fever and unexpected weight change  HENT: Negative for congestion, rhinorrhea and sore throat  Eyes: Negative for redness and visual disturbance  Respiratory: Negative for cough and shortness of breath  Cardiovascular: Negative for chest pain and leg swelling  Gastrointestinal: Negative for abdominal pain, constipation, diarrhea, nausea and vomiting  Endocrine: Negative for cold intolerance and heat intolerance  Genitourinary: Negative for dysuria, frequency and urgency  Musculoskeletal: Negative for back pain  Skin: Negative for rash  Neurological: Negative for dizziness, syncope and numbness  Psychiatric/Behavioral: Positive for hallucinations, sleep disturbance and suicidal ideas  The patient is hyperactive  All other systems reviewed and are negative  Physical Exam  ED Triage Vitals [03/04/18 1343]   Temperature Pulse Respirations Blood Pressure SpO2   98 1 °F (36 7 °C) (!) 109 18 134/76 98 %      Temp Source Heart Rate Source Patient Position - Orthostatic VS BP Location FiO2 (%)   Tympanic Monitor Sitting Left arm --      Pain Score       No Pain         Physical Exam   Constitutional: She is oriented to person, place, and time  She appears well-developed and well-nourished  No distress  HENT:   Head: Normocephalic and atraumatic  Nose: Nose normal    Mouth/Throat: No oropharyngeal exudate  Eyes: Conjunctivae and EOM are normal  Pupils are equal, round, and reactive to light  Neck: Normal range of motion  Neck supple  Cardiovascular: Normal rate, regular rhythm and normal heart sounds  Exam reveals no gallop  No murmur heard  Pulmonary/Chest: Effort normal and breath sounds normal  She has no wheezes  She exhibits no tenderness  Abdominal: Soft  Bowel sounds are normal  She exhibits no distension  There is no tenderness  There is no rebound and no guarding  Musculoskeletal: Normal range of motion  She exhibits no tenderness or deformity  Lymphadenopathy:     She has no cervical adenopathy  Neurological: She is alert and oriented to person, place, and time  No cranial nerve deficit  Skin: Skin is warm and dry  No rash noted  She is not diaphoretic  No erythema  Psychiatric: She has a normal mood and affect  Her speech is normal  She is hyperactive and actively hallucinating  Thought content is paranoid and delusional  Cognition and memory are normal  She expresses suicidal ideation  She expresses no homicidal ideation  She expresses no suicidal plans  Nursing note and vitals reviewed  ED Medications  Medications   potassium chloride (K-DUR,KLOR-CON) CR tablet 40 mEq (40 mEq Oral Given 3/4/18 1521)       Diagnostic Studies  Results Reviewed     Procedure Component Value Units Date/Time    Urine Microscopic [[id number]]  (Abnormal) Collected:  03/04/18 1620    Lab Status:  Final result Specimen:  Urine from Urine, Clean Catch Updated:  03/04/18 1653     RBC, UA None Seen /hpf      WBC, UA 2-4 (A) /hpf      Epithelial Cells None Seen /hpf      Bacteria, UA None Seen /hpf     Rapid drug screen, urine [[id number]]  (Normal) Collected:  03/04/18 1620    Lab Status:  Final result Specimen:  Urine from Urine, Clean Catch Updated:  03/04/18 1634     Amph/Meth UR Negative     Barbiturate Ur Negative     Benzodiazepine Urine Negative     Cocaine Urine Negative     Methadone Urine Negative     Opiate Urine Negative     PCP Ur Negative     THC Urine Negative    Narrative:         FOR MEDICAL PURPOSES ONLY  IF CONFIRMATION NEEDED PLEASE CONTACT THE LAB WITHIN 5 DAYS      Drug Screen Cutoff Levels:  AMPHETAMINE/METHAMPHETAMINES  1000 ng/mL  BARBITURATES     200 ng/mL  BENZODIAZEPINES     200 ng/mL  COCAINE      300 ng/mL  METHADONE      300 ng/mL  OPIATES      300 ng/mL  PHENCYCLIDINE     25 ng/mL  THC       50 ng/mL    UA w Reflex to Microscopic [[id number]]  (Abnormal) Collected:  03/04/18 1620    Lab Status:  Final result Specimen:  Urine from Urine, Clean Catch Updated:  03/04/18 1632     Color, UA Yellow     Clarity, UA Clear     Specific [place], UA 1 003     pH, UA 6 0     Leukocytes, UA Small (A)     Nitrite, UA Negative     Protein, UA Negative mg/dl      Glucose, UA Negative mg/dl      Ketones, UA Negative mg/dl      Urobilinogen, UA 1 0 E U /dl      Bilirubin, UA Negative     Blood, UA Negative    ED Urine Macroscopic [[id number]]  (Abnormal) Collected:  03/04/18 1623    Lab Status:  Final result Specimen:  Urine Updated:  03/04/18 1617     Color, UA Yellow     Clarity, UA Clear     pH, UA 6 0     Leukocytes, UA Small (A)     Nitrite, UA Negative     Protein, UA Negative mg/dl      Glucose, UA Negative mg/dl      Ketones, UA Negative mg/dl      Urobilinogen, UA 0 2 E U /dl      Bilirubin, UA Negative     Blood, UA Negative     Specific Gravity, UA <=1 005    Narrative:       CLINITEK RESULT    Comprehensive metabolic panel [[id number]]  (Abnormal) Collected:  03/04/18 1419    Lab Status:  Final result Specimen:  Blood from Arm, Right Updated:  03/04/18 1452     Sodium 140 mmol/L      Potassium 2 9 (L) mmol/L      Chloride 102 mmol/L      CO2 32 mmol/L      Anion Gap 6 mmol/L      BUN 5 mg/dL      Creatinine 0 75 mg/dL      Glucose 111 mg/dL      Calcium 8 8 mg/dL      AST 14 U/L      ALT 15 U/L      Alkaline Phosphatase 60 U/L      Total Protein 7 4 g/dL      Albumin 3 6 g/dL      Total Bilirubin 0 63 mg/dL      eGFR 86 ml/min/1 73sq m     Narrative:         National Kidney Disease Education Program recommendations are as follows:  GFR calculation is accurate only with a steady state creatinine  Chronic Kidney disease less than 60 ml/min/1 73 sq  meters  Kidney failure less than 15 ml/min/1 73 sq  meters      TSH [[id number]]  (Abnormal) Collected:  03/04/18 1419    Lab Status:  Final result Specimen:  Blood from Arm, Right Updated:  03/04/18 1452     TSH 3RD GENERATON 4 090 (H) uIU/mL     Narrative:         Patients undergoing fluorescein dye angiography may retain small amounts of fluorescein in the body for 48-72 hours post procedure  Samples containing fluorescein can produce falsely depressed TSH values  If the patient had this procedure,a specimen should be resubmitted post fluorescein clearance  The recommended reference ranges for TSH during pregnancy are as follows:  First trimester 0 1 to 2 5 uIU/mL  Second trimester  0 2 to 3 0 uIU/mL  Third trimester 0 3 to 3 0 uIU/m      Ethanol [[id number]]  (Normal) Collected:  03/04/18 1419    Lab Status:  Final result Specimen:  Blood from Arm, Right Updated:  03/04/18 1450     Ethanol Lvl <3 mg/dL     CBC and differential [[id number]]  (Normal) Collected:  03/04/18 1419    Lab Status:  Final result Specimen:  Blood from Arm, Right Updated:  03/04/18 1425     WBC 6 47 Thousand/uL      RBC 4 84 Million/uL      Hemoglobin 15 2 g/dL      Hematocrit 43 2 %      MCV 89 fL      MCH 31 4 pg      MCHC 35 2 g/dL      RDW 12 7 %      MPV 11 4 fL      Platelets 829 Thousands/uL      nRBC 0 /100 WBCs      Neutrophils Relative 58 %      Lymphocytes Relative 30 %      Monocytes Relative 10 %      Eosinophils Relative 1 %      Basophils Relative 1 %      Neutrophils Absolute 3 70 Thousands/µL      Lymphocytes Absolute 1 92 Thousands/µL      Monocytes Absolute 0 67 Thousand/µL      Eosinophils Absolute 0 09 Thousand/µL      Basophils Absolute 0 08 Thousands/µL         No orders to display     Procedures  Procedures    Phone Consults  ED Phone Contact    ED Course    A/P: This is a 65 y o  female who presents to the ED for evaluation of hallucinations  Known to patient  Jennifer psych labs  1455 Note hypokalemia  Will replete K  Will give RX for K replacement as IP, no acute indication for IP admission  Patient medically cleared  Crisis to see  [phone] signed, bed search by crisis  1810 Accepted on [facility], Dr Georgia Perez      [facility] Time    Disposition  Final diagnoses:   Psychosis   Sch[facility] ([facility] )   Hypokalemia     Time reflects when diagnosis was documented in both MDM as applicable and the Disposition within this note     Time User Action Codes Description Comment    3/4/2018  2:55 PM Renelle Ruiz Add [F29] Psychosis     3/4/2018  2:55 PM Renelle Ruiz Add [F20 9] Schizophrenia ([facility] [facility] )     3/4/2018  2:55 PM Renelle Ruiz Add [E87 6] Hypokalemia       ED Disposition     ED Disposition Condition Comment    Transfer to [facility]  Dr Shai Silveira Physician  Dr Sandra Moon Name, Lula Handy      RN Documentation    [address] Name, [address]   EC[facility]      Follow-up Information    None       Patient's Medications   Discharge Prescriptions    POTASSIUM CHLORIDE (K-DUR,KLOR-CON) 10 MEQ TABLET    Take 2 tablets (20 mEq total) by mouth 2 (two) times a day for 7 days       Start Date: 3/4/2018  End Date: 3/11/2018       Order Dose: 20 mEq       Quantity: 28 tablet    Refills: 0     No discharge procedures on file  ED Provider  Attending physically available and evaluated Zi Nevarez I managed the patient along with the ED Attending      Electronically Signed by         Alicia Velásquez MD  03/04/18 8878

## 2018-03-04 NOTE — ED NOTES
Primary Payor is Medicare A & B    Insurance Authorization: Secondary Payor  Phone call placed to [facility] for [address]   Phone number: [phone]  Spoke to Yung Toro   [facility]  days approved    Level of care: Inpatient mental health

## 2018-03-04 NOTE — ED NOTES
Intake evaluation done by crisis, bed search initiated at this time     Viola Chicas, SALUD  03/04/18 7810

## 2018-03-04 NOTE — LETTER
March 23, 2018    [place]    Patient: Bartolome Dempsey   YOB: 1955   Date of Visit: 3/4/2018       Dear Dr Hubbard Wanda:        Sincerely,      No name on file          CC: No Recipients

## 2018-03-04 NOTE — ED NOTES
Patient is accepted at [facility] [place] [address]   Patient is accepted by Dr Polly Marcelo per [address] is arranged with internal techs  Transportation is scheduled for    Patient may go to the floor at anytime after report is called  Nurse report is to be called to [phone] prior to patient transfer

## 2018-03-05 LAB
ALBUMIN SERPL BCP-MCNC: 3.4 G/DL (ref 3.5–5)
ALP SERPL-CCNC: 58 U/L (ref 46–116)
ALT SERPL W P-5'-P-CCNC: 15 U/L (ref 12–78)
ANION GAP SERPL CALCULATED.3IONS-SCNC: 6 MMOL/L (ref 4–13)
AST SERPL W P-5'-P-CCNC: 14 U/L (ref 5–45)
BILIRUB SERPL-MCNC: 0.72 MG/DL (ref 0.2–1)
BUN SERPL-MCNC: 8 MG/DL (ref 5–25)
CALCIUM SERPL-MCNC: 8.5 MG/DL (ref 8.3–10.1)
CHLORIDE SERPL-SCNC: 105 MMOL/L (ref 100–108)
CHOLEST SERPL-MCNC: 204 MG/DL (ref 50–200)
CO2 SERPL-SCNC: 29 MMOL/L (ref 21–32)
CREAT SERPL-MCNC: 0.62 MG/DL (ref 0.6–1.3)
GFR SERPL CREATININE-BSD FRML MDRD: 98 ML/MIN/1.73SQ M
GLUCOSE SERPL-MCNC: 108 MG/DL (ref 65–140)
HDLC SERPL-MCNC: 49 MG/DL (ref 40–60)
LDLC SERPL CALC-MCNC: 133 MG/DL (ref 0–100)
POTASSIUM SERPL-SCNC: 3 MMOL/L (ref 3.5–5.3)
PROT SERPL-MCNC: 7 G/DL (ref 6.4–8.2)
SODIUM SERPL-SCNC: 140 MMOL/L (ref 136–145)
TRIGL SERPL-MCNC: 111 MG/DL

## 2018-03-05 PROCEDURE — 99232 SBSQ HOSP IP/OBS MODERATE 35: CPT | Performed by: INTERNAL MEDICINE

## 2018-03-05 PROCEDURE — 86592 SYPHILIS TEST NON-TREP QUAL: CPT | Performed by: PSYCHIATRY & NEUROLOGY

## 2018-03-05 PROCEDURE — 80061 LIPID PANEL: CPT | Performed by: PSYCHIATRY & NEUROLOGY

## 2018-03-05 PROCEDURE — 80053 COMPREHEN METABOLIC PANEL: CPT | Performed by: PSYCHIATRY & NEUROLOGY

## 2018-03-05 PROCEDURE — 99222 1ST HOSP IP/OBS MODERATE 55: CPT | Performed by: PSYCHIATRY & NEUROLOGY

## 2018-03-05 RX ORDER — RISPERIDONE 0.25 MG/1
0.5 TABLET, FILM COATED ORAL 2 TIMES DAILY
Status: DISCONTINUED | OUTPATIENT
Start: 2018-03-05 | End: 2018-03-05

## 2018-03-05 RX ORDER — LEVOTHYROXINE SODIUM 0.05 MG/1
50 TABLET ORAL
Status: DISCONTINUED | OUTPATIENT
Start: 2018-03-05 | End: 2018-03-19 | Stop reason: HOSPADM

## 2018-03-05 RX ORDER — RISPERIDONE 1 MG/1
1 TABLET, FILM COATED ORAL 2 TIMES DAILY
Status: DISCONTINUED | OUTPATIENT
Start: 2018-03-05 | End: 2018-03-06

## 2018-03-05 RX ORDER — SERTRALINE HYDROCHLORIDE 100 MG/1
100 TABLET, FILM COATED ORAL DAILY
Status: DISCONTINUED | OUTPATIENT
Start: 2018-03-05 | End: 2018-03-19 | Stop reason: HOSPADM

## 2018-03-05 RX ORDER — TRAZODONE HYDROCHLORIDE 100 MG/1
100 TABLET ORAL
Status: DISCONTINUED | OUTPATIENT
Start: 2018-03-05 | End: 2018-03-19 | Stop reason: HOSPADM

## 2018-03-05 RX ORDER — IBUPROFEN 400 MG/1
400 TABLET ORAL EVERY 6 HOURS PRN
Status: DISCONTINUED | OUTPATIENT
Start: 2018-03-05 | End: 2018-03-19 | Stop reason: HOSPADM

## 2018-03-05 RX ADMIN — TRAZODONE HYDROCHLORIDE 100 MG: 100 TABLET ORAL at 21:22

## 2018-03-05 RX ADMIN — HALOPERIDOL 5 MG: 5 TABLET ORAL at 02:19

## 2018-03-05 RX ADMIN — POTASSIUM CHLORIDE 40 MEQ: 1500 TABLET, EXTENDED RELEASE ORAL at 09:41

## 2018-03-05 RX ADMIN — SERTRALINE HYDROCHLORIDE 100 MG: 100 TABLET ORAL at 12:16

## 2018-03-05 RX ADMIN — RISPERIDONE 1 MG: 1 TABLET ORAL at 12:16

## 2018-03-05 RX ADMIN — RISPERIDONE 1 MG: 1 TABLET ORAL at 17:20

## 2018-03-05 NOTE — PLAN OF CARE
Problem: DISCHARGE PLANNING  Goal: Discharge to home or other facility with appropriate resources  INTERVENTIONS:  - Identify barriers to discharge w/patient and caregiver  - Arrange for needed discharge resources and transportation as appropriate  - Identify discharge learning needs (meds, wound care, etc )  - Arrange for interpretive services to assist at discharge as needed  - Refer to Case Management Department for coordinating discharge planning if the patient needs post-hospital services based on physician/advanced practitioner order or complex needs related to functional status, cognitive ability, or social support system  Outcome: Progressing  Meet with pt to review Tx plan and initiate dc planning

## 2018-03-05 NOTE — PROGRESS NOTES
Pt woke at 0215 and screamed loudly from her room  Hearing voices coming from her clock  Medicated with Haldol PRN  Pt asked to walk in halls, but had her sit in small TV room instead  Came returned  to her BR at 0330, but has not slept since 0215  Pt reports still hearing voices, but appears much calmer and able to rest in her room quietly-Haldol somewhat effective  Continuing to monitor

## 2018-03-05 NOTE — PLAN OF CARE
PSYCHOSIS     Will report no hallucinations or delusions Not Progressing          Alteration in Thoughts and Perception     Treatment Goal: Gain control of psychotic behaviors/thinking, reduce/eliminate presenting symptoms and demonstrate improved reality functioning upon discharge Progressing     Recognize dysfunctional thoughts, communicate reality-based thoughts at the time of discharge [address] Will report anxiety at manageable levels Progressing     By discharge: Patient will verbalize 2 strategies to deal with anxiety Progressing        DEPRESSION     Will be euthymic at discharge Progressing        Prexisting or High Potential for Compromised Skin Integrity     Skin integrity is maintained or improved Progressing        SELF HARM/SUICIDALITY     Will have no self-injury during hospital stay Progressing

## 2018-03-05 NOTE — PROGRESS NOTES
Pt 201 from [facility] ED  As per ED notes pt brought in by sister and brother after finding that she was not using medications and not caring for self Pt admits to hearing voices, but denies command hallucination to hurt self as she had admitted to in ED  Reports voices tell her that clocks are set wrong and that her locks have been disabled  Also denies SI/HI/VH  Contracts for safety  Admits to feeling anxious and depressed about her medical status

## 2018-03-05 NOTE — PROGRESS NOTES
Pt calm, pleasant, cooperative, and continues to be med compliant  Pt reports 'still hearing voices, but unsure of what they're saying ' Pt reports 'they are just saying a lot like they do ' Pt has been out in the milieu most of the morning, social with select peers  Will continue to monitor

## 2018-03-05 NOTE — CONSULTS
H&P Exam - Onel Ruelas 65 y o  female MRN: [id number]    Unit/Bed#: MP4 575-01 Encounter: [id number]    Assessment:  Schizophrenia with auditory hallucinations  Hypothyroidism  Hypokalemia    Plan:  Admit to behavior Health unit for evaluation treatment  -orders per psychiatrist  -continue home maintenance medications  -potassium chloride as ordered    History of Present Illness   81-year-old female presents with increasing auditory hallucinations  She does have a history of schizophrenia and has recently been noncompliant with medications  She states she has been hearing voices for approximately 4 years and that medications are not helping with the symptoms  She denies any medical complaints of shortness of breath, chest pain, and abdominal pain  Review of Systems   Constitutional: Negative for activity change, appetite change, chills and fever  HENT: Negative for congestion, ear pain, hearing loss, postnasal drip, sore throat, trouble swallowing and voice change  Eyes: Negative for photophobia, pain, redness and visual disturbance  Respiratory: Negative for apnea, cough, chest tightness, shortness of breath and wheezing  Cardiovascular: Negative for chest pain, palpitations and leg swelling  Gastrointestinal: Negative for abdominal distention, abdominal pain, constipation, diarrhea, nausea and vomiting  Endocrine: Negative for cold intolerance and heat intolerance  Genitourinary: Negative for dysuria, hematuria and urgency  Musculoskeletal: Negative for arthralgias, back pain, joint swelling, myalgias and neck pain  Skin: Negative for rash and wound  Allergic/Immunologic: Negative for immunocompromised state  Neurological: Negative for dizziness, facial asymmetry, weakness, light-headedness, numbness and headaches  Hematological: Negative for adenopathy  Psychiatric/Behavioral: Positive for hallucinations  Negative for behavioral problems and dysphoric mood   The patient is not nervous/anxious  Historical Information   Past Medical History:   Diagnosis Date    Anxiety     [facility] )     Dementia     Disease of thyroid gland     Psychiatric illness     Psychosis     [facility] ([facility] )     Suicide attempt      History reviewed  No pertinent surgical history  Social History   History   Alcohol Use No     History   Drug Use No     History   Smoking Status    Never Smoker   Smokeless Tobacco    Never Used     Comment: pt denies smoking     Family History:   Family History   Problem Relation Age of Onset    Emphysema Mother     Cancer Father     Diabetes Neg Hx     Heart disease Neg Hx        Meds/Allergies   PTA meds:   Prior to Admission Medications   Prescriptions Last Dose Informant Patient Reported?  Taking?   levothyroxine 50 mcg tablet 3/4/2018 at Unknown time  Yes Yes   Sig: Take 50 mcg by mouth daily   risperiDONE (RisperDAL) 0 5 mg tablet Past Month at Unknown time  No Yes   Sig: Take 1 tablet by mouth 2 (two) times a day   sertraline (ZOLOFT) 100 mg tablet Past Month at Unknown time  No Yes   Sig: Take 1 tablet by mouth daily   traZODone (DESYREL) 100 mg tablet 3/3/2018 at Unknown time  No Yes   Sig: Take 1 tablet by mouth daily at bedtime      Facility-Administered Medications: None     Allergies   Allergen Reactions    Codeine     Sulfa Antibiotics     Tylenol [Acetaminophen]        Objective   First Vitals:   Blood Pressure: 134/76 (03/04/18 1343)  Pulse: (!) 109 (03/04/18 1343)  Temperature: 98 1 °F (36 7 °C) (03/04/18 1343)  Temp Source: Tympanic (03/04/18 1343)  Respirations: 18 (03/04/18 1343)  Height: 5' 4" (162 6 cm) (03/04/18 1919)  Weight - Scale: 63 5 kg (140 lb) (03/04/18 1343)  SpO2: 98 % (03/04/18 1343)    Current Vitals:   Blood Pressure: 133/66 (03/05/18 0700)  Pulse: 94 (03/05/18 0700)  Temperature: 98 2 °F (36 8 °C) (03/05/18 0700)  Temp Source: Tympanic (03/05/18 0700)  Respirations: 18 (03/05/18 0700)  Height: 5' 4" (162 6 cm) (03/04/18 1919)  Weight - Scale: 61 3 kg (135 lb 2 3 oz) (03/04/18 1919)  SpO2: 96 % (03/05/18 0700)        Physical Exam   Constitutional: She is oriented to person, place, and time  She appears well-developed and well-nourished  HENT:   Head: Normocephalic and atraumatic  Mouth/Throat: Oropharynx is clear and moist    Eyes: Pupils are equal, round, and reactive to light  Strabismus left eye   Neck: Neck supple  Cardiovascular: Normal rate and regular rhythm  No murmur heard  Pulmonary/Chest: Breath sounds normal  She has no wheezes  She has no rales  Abdominal: Soft  Bowel sounds are normal  She exhibits no distension  There is no tenderness  There is no rebound and no guarding  Genitourinary:   Genitourinary Comments: deferred   Musculoskeletal: Normal range of motion  She exhibits no edema  Lymphadenopathy:     She has no cervical adenopathy  Neurological: She is alert and oriented to person, place, and time  No cranial nerve deficit  Skin: Skin is warm  No rash noted  No erythema  Psychiatric: She has a normal mood and affect     Pleasant, cooperative       Lab Results:      Recent Results (from the past 36 hour(s))   CBC and differential    Collection Time: 03/04/18  2:19 PM   Result Value Ref Range    WBC 6 47 4 31 - 10 16 Thousand/uL    RBC 4 84 3 81 - 5 12 Million/uL    Hemoglobin 15 2 11 5 - 15 4 g/dL    Hematocrit 43 2 34 8 - 46 1 %    MCV 89 82 - 98 fL    MCH 31 4 26 8 - 34 3 pg    MCHC 35 2 31 4 - 37 4 g/dL    RDW 12 7 11 6 - 15 1 %    MPV 11 4 8 9 - 12 7 fL    Platelets 541 501 - 706 Thousands/uL    nRBC 0 /100 WBCs    Neutrophils Relative 58 43 - 75 %    Lymphocytes Relative 30 14 - 44 %    Monocytes Relative 10 4 - 12 %    Eosinophils Relative 1 0 - 6 %    Basophils Relative 1 0 - 1 %    Neutrophils Absolute 3 70 1 85 - 7 62 Thousands/µL    Lymphocytes Absolute 1 92 0 60 - 4 47 Thousands/µL    Monocytes Absolute 0 67 0 17 - 1 22 Thousand/µL    Eosinophils Absolute 0  09 0 00 - 0 61 Thousand/µL    Basophils Absolute 0 08 0 00 - 0 10 Thousands/µL   Comprehensive metabolic panel    Collection Time: 03/04/18  2:19 PM   Result Value Ref Range    Sodium 140 136 - 145 mmol/L    Potassium 2 9 (L) 3 5 - 5 3 mmol/L    Chloride 102 100 - 108 mmol/L    CO2 32 21 - 32 mmol/L    Anion Gap 6 4 - 13 mmol/L    BUN 5 5 - 25 mg/dL    Creatinine 0 75 0 60 - 1 30 mg/dL    Glucose 111 65 - 140 mg/dL    Calcium 8 8 8 3 - 10 1 mg/dL    AST 14 5 - 45 U/L    ALT 15 12 - 78 U/L    Alkaline Phosphatase 60 46 - 116 U/L    Total Protein 7 4 6 4 - 8 2 g/dL    Albumin 3 6 3 5 - 5 0 g/dL    Total Bilirubin 0 63 0 20 - 1 00 mg/dL    eGFR 86 ml/min/1 73sq m   TSH    Collection Time: 03/04/18  2:19 PM   Result Value Ref Range    TSH 3RD GENERATON 4 090 (H) 0 358 - 3 740 uIU/mL   Ethanol    Collection Time: 03/04/18  2:19 PM   Result Value Ref Range    Ethanol Lvl <3 0 - 3 mg/dL   ECG 12 lead    Collection Time: 03/04/18  2:19 PM   Result Value Ref Range    Ventricular Rate 92 BPM    Atrial Rate 92 BPM    OK Interval 182 ms    QRSD Interval 80 ms    QT Interval 352 ms    QTC Interval 435 ms    P Axis 74 degrees    QRS Axis 6 degrees    T Wave Axis 64 degrees   UA w Reflex to Microscopic    Collection Time: 03/04/18  4:20 PM   Result Value Ref Range    Color, UA Yellow     Clarity, UA Clear     Specific [place], UA 1 003 1 003 - 1 030    pH, UA 6 0 4 5 - 8 0    Leukocytes, UA Small (A) Negative    Nitrite, UA Negative Negative    Protein, UA Negative Negative mg/dl    Glucose, UA Negative Negative mg/dl    Ketones, UA Negative Negative mg/dl    Urobilinogen, UA 1 0 0 2, 1 0 E U /dl E U /dl    Bilirubin, UA Negative Negative    Blood, UA Negative Negative   Rapid drug screen, urine    Collection Time: 03/04/18  4:20 PM   Result Value Ref Range    Amph/Meth UR Negative Negative    Barbiturate Ur Negative Negative    Benzodiazepine Urine Negative Negative    Cocaine Urine Negative Negative    Methadone Urine Negative Negative    Opiate Urine Negative Negative    PCP Ur Negative Negative    THC Urine Negative Negative   Urine Microscopic    Collection Time: 03/04/18  4:20 PM   Result Value Ref Range    RBC, UA None Seen None Seen, 0-5 /hpf    WBC, UA 2-4 (A) None Seen, 0-5, 5-55, 5-65 /hpf    Epithelial Cells None Seen None Seen, Occasional /hpf    Bacteria, UA None Seen None Seen, Occasional /hpf   ED Urine Macroscopic    Collection Time: 03/04/18  4:23 PM   Result Value Ref Range    Color, UA Yellow     Clarity, UA Clear     pH, UA 6 0 4 5 - 8 0    Leukocytes, UA Small (A) Negative    Nitrite, UA Negative Negative    Protein, UA Negative Negative mg/dl    Glucose, UA Negative Negative mg/dl    Ketones, UA Negative Negative mg/dl    Urobilinogen, UA 0 2 0 2, 1 0 E U /dl E U /dl    Bilirubin, UA Negative Negative    Blood, UA Negative Negative    Specific Gravity, UA <=1 005 1 003 - 1 030   Lipid panel    Collection Time: 03/05/18  5:32 AM   Result Value Ref Range    Cholesterol 204 (H) 50 - 200 mg/dL    Triglycerides 111 <=150 mg/dL    HDL, Direct 49 40 - 60 mg/dL    LDL Calculated 133 (H) 0 - 100 mg/dL   Comprehensive metabolic panel    Collection Time: 03/05/18  5:32 AM   Result Value Ref Range    Sodium 140 136 - 145 mmol/L    Potassium 3 0 (L) 3 5 - 5 3 mmol/L    Chloride 105 100 - 108 mmol/L    CO2 29 21 - 32 mmol/L    Anion Gap 6 4 - 13 mmol/L    BUN 8 5 - 25 mg/dL    Creatinine 0 62 0 60 - 1 30 mg/dL    Glucose 108 65 - 140 mg/dL    Calcium 8 5 8 3 - 10 1 mg/dL    AST 14 5 - 45 U/L    ALT 15 12 - 78 U/L    Alkaline Phosphatase 58 46 - 116 U/L    Total Protein 7 0 6 4 - 8 2 g/dL    Albumin 3 4 (L) 3 5 - 5 0 g/dL    Total Bilirubin 0 72 0 20 - 1 00 mg/dL    eGFR 98 ml/min/1 73sq m

## 2018-03-05 NOTE — PROGRESS NOTES
Pt initially refused to take her evening dose of medication stating 'it's just not going to work ' After encouragement, pt took her medication  Will continue to monitor

## 2018-03-05 NOTE — PLAN OF CARE
Problem: Ineffective Coping  Goal: Participates in unit activities  Interventions:  - Provide therapeutic environment   - Provide required programming   - Redirect inappropriate behaviors    Outcome: Progressing  Pt quietly, cooperative; needing some staff assistance for spelling in worksheets during groups  She reports a trigger as hearing voices and reports believing that it will never get better  Pt listens to peers but rarely engaged in the group topic discussions

## 2018-03-05 NOTE — H&P
Psychiatric Evaluation - Behavioral Health     Identification Data:Candy Pisano 65 y o  female MRN: [id number]  Unit/Bed#: RA2 575-01 Encounter: [id number]    Chief Complaint:    Hearing voices and thoughts of overdosing    History of present illness:    Patient is a 65years old  female admitted on a 201 due to above symptoms which have been present for a few months  She reports that she hears voices of 6 individuals all named Alex Magaña who tell her to do or not to do certain things such as keeping her clothes on when she goes to bed or not showering  These are scary and she believes that they belong to real individuals and they follow her wherever she goes including here at the hospital   She has some vegetative signs of depression such as insomnia and poor appetite and weight loss of 10 lb over the past few weeks  Although she is upset about this whole situation she does not report crying spells  In January of this year, she was so upset that she took an overdose but did not seek medical help she is not clear what she overdosed on  She is reportedly under care of a psychiatrist but she does not remember the name  Psychiatric Review Of Systems:  Change in sleep: yes  appetite changes: yes  weight changes: yes  Change in energy/anergy: no  Change in interest/pleasure/anhedonia: no  somatic symptoms: no  anxiety/panic: no  juan: no  guilty/hopeless: no  self injurious behavior/risky behavior: yes    Historical Information     Past Psychiatric History:     The patient is not clear about her past  Psychiatric history put but it appears that for the past several years, she has had psychotic symptoms and in fact, she was once admitted to [facility] in May 2017 and was diagnosed with schizoaffective disorder    She has attempted suicide once  Substance Abuse History:  Denies history of substance abuse    Family Psychiatric History:   Her sister has received mental health treatment but the details are not known    Social History:  Developmental:Uneventful  Education: high school diploma/GED  Marital history:  after 29 years of marriage -she reports that she has 3 children  Living arrangement, social support: lives alone  Occupational History: on permanent disability  Access to firearms: None    Traumatic History:   Abuse:none is reported  Other Traumatic Events: none    Past Medical History:   Diagnosis Date    Anxiety     [facility]     Dementia     Disease of thyroid gland     Psychiatric illness     Psychosis     [facility]oaffective disorder ([facility] )     Suicide attempt        Medical Review Of Systems:  Pertinent items are noted in HPI  Meds/Allergies   all current active meds have been reviewed  Allergies   Allergen Reactions    Codeine     Sulfa Antibiotics     Tylenol [Acetaminophen]      Objective     Mental Status Evaluation:  Appearance:  {Adequate hygiene and grooming and Good eye contact   Behavior:  calm and cooperative   Speech:   Language Normal rate and Normal volume  No overt abnormality   Mood:  dysphoric   Affect: Thought process blunted and constricted  Goal directed and coherent   Associations: Tightly connected   Thought Content:  Delusions of persecution   Perceptual Disturbances: Auditory hallucinations with commands to do or not to do different things   Risk Potential: No suicidal or homicidal ideation   Orientation  Oriented x 3   Memory grossly intact   Attention/Concentration attention span appeared shorter than expected for age   Fund of knowledge Not assessed   Insight:  limited   Judgment: Good judgment   Gait/Station: normal gait/station and normal balance   Motor Activity: No abnormal movement noted         Lab Results: I have personally reviewed pertinent lab results      Imaging Studies:  No pertinent data  EKG, Pathology, and Other Studies:  Ventricular Rate BPM 92  102      Atrial Rate BPM 92  102     FL Interval ms 182  176     QRSD Interval ms 80  84 QT Interval ms 352  310     QTC Interval ms 435  404     P Axis degrees 74  36     QRS Axis degrees 6  17     T Wave Axis degrees 64  55    Narrative     Normal sinus rhythm  Normal ECG  When compared with ECG of 16-MAY-2017 16:16,  No significant change was found          full code    Patient Strengths/Assets: cooperative    Patient Barriers/Limitations: limited support system    Assessment/Plan     Principal Problem:    Schizoaffective disorder, depressive type ([facility] )    Plan: We will gradually increase risperidone to a therapeutic dose  Risks, benefits and possible side effects of Medications:   Risks, benefits, and possible side effects of medications explained to patient and patient verbalizes understanding

## 2018-03-06 LAB — RPR SER QL: NORMAL

## 2018-03-06 PROCEDURE — 99231 SBSQ HOSP IP/OBS SF/LOW 25: CPT | Performed by: PSYCHIATRY & NEUROLOGY

## 2018-03-06 RX ORDER — RISPERIDONE 1 MG/1
2 TABLET, FILM COATED ORAL
Status: DISCONTINUED | OUTPATIENT
Start: 2018-03-06 | End: 2018-03-09

## 2018-03-06 RX ORDER — RISPERIDONE 1 MG/1
1 TABLET, FILM COATED ORAL DAILY
Status: DISCONTINUED | OUTPATIENT
Start: 2018-03-07 | End: 2018-03-08

## 2018-03-06 RX ADMIN — RISPERIDONE 1 MG: 1 TABLET ORAL at 08:25

## 2018-03-06 RX ADMIN — RISPERIDONE 2 MG: 1 TABLET ORAL at 21:41

## 2018-03-06 RX ADMIN — POTASSIUM CHLORIDE 40 MEQ: 1500 TABLET, EXTENDED RELEASE ORAL at 08:24

## 2018-03-06 RX ADMIN — TRAZODONE HYDROCHLORIDE 100 MG: 100 TABLET ORAL at 21:41

## 2018-03-06 RX ADMIN — LEVOTHYROXINE SODIUM 50 MCG: 50 TABLET ORAL at 06:24

## 2018-03-06 RX ADMIN — SERTRALINE HYDROCHLORIDE 100 MG: 100 TABLET ORAL at 08:25

## 2018-03-06 NOTE — PROGRESS NOTES
Pt alert and cooperative  Spent the majority of the shift in her room sleeping  She reports hearing voices but denied all other s/s  When asked what the voices were saying she stated "they tell me things, I don't know"  She is medication compliant  Will continue to monitor

## 2018-03-06 NOTE — PROGRESS NOTES
Progress Note - Behavioral Health     Da Cheney 65 y o  female MRN: [id number]  Unit/Bed#: PC3 575-01 Encounter: [id number]    Da Cheney was seen for continuing care and reviewed with treatment team   Pt reported "Just the voices that I hear" is her only complaint  They threaten her if she does not do what they ask  They never command her to hurt herself or others but she feels they can make her legs and arms hurt  She is "Disgusted" with them and sometimes wishes for her own death to escape them  However, she is unsure if she is truly depressed or anxious at this moment  She presently denies any specific plan of suicide but verbalizes that she does not trust herself if she was not inpatient  She denies visual or tactile hallucinations  Floor team relays she had an episode of screaming in her room due to auditory hallucinations disturbing her  She was given Haldol prn dose with partial benefit  demonstrated some hesitation with taking her medications but has been compliant overall  But has been noted to be more social in the milieu  She has been attending more groups --though mainly to listen to others  Sleep: Good  Appetite: Good  Medication side effects: None per Pt  ROS: No complaints    Labs/Tests: Reviewed     Mental Status Evaluation:  Appearance:  Dressed, Fair hygiene, partial eye contact   Behavior:  Somewhat guarded and withdrawn, although pleasant  Can stare off into the distance at times  Speech:  Soft, clear, coherent with significant latency at times  Mood:  Dysphoric, and appears anxious   Affect:  Blunted   Thought Process:  appears organized at present, though poverty of thought   Associations: Intact associations   Thought Content:  paranoid, untrusting   Perceptual Disturbances: Pt admits to [facility] but denies paranoia  She appears paranoid and at times it seems she may be internally preoccupied     Risk Potential: Yes--has passive death wish but no plan, though would not trust herself if outpatient   Sensorium:  Self, birthday, Place, Year   Memory:  short term memory grossly intact   Consciousness:  alert, awake   Attention: Attention span appeared shorter than expected for age   Insight:  Limited   Judgment: Limited   Gait/Station: Normal gait/station   Motor Activity: No abnormal movements     Vitals:    03/06/18 0705   BP: 128/79   Pulse: 89   Resp: 18   Temp: 98 7 °F (37 1 °C)   SpO2: 95%       Admission on 03/04/2018   Component Date Value    WBC 03/04/2018 6 47     RBC 03/04/2018 4 84     Hemoglobin 03/04/2018 15 2     Hematocrit 03/04/2018 43 2     MCV 03/04/2018 89     MCH 03/04/2018 31 4     MCHC 03/04/2018 35 2     RDW 03/04/2018 12 7     MPV 03/04/2018 11 4     Platelets 54/94/9953 290     nRBC 03/04/2018 0     Neutrophils Relative 03/04/2018 58     Lymphocytes Relative 03/04/2018 30     Monocytes Relative 03/04/2018 10     Eosinophils Relative 03/04/2018 1     Basophils Relative 03/04/2018 1     Neutrophils Absolute 03/04/2018 3 70     Lymphocytes Absolute 03/04/2018 1 92     Monocytes Absolute 03/04/2018 0 67     Eosinophils Absolute 03/04/2018 0 09     Basophils Absolute 03/04/2018 0 08     Sodium 03/04/2018 140     Potassium 03/04/2018 2 9*    Chloride 03/04/2018 102     CO2 03/04/2018 32     Anion Gap 03/04/2018 6     BUN 03/04/2018 5     Creatinine 03/04/2018 0 75     Glucose 03/04/2018 111     Calcium 03/04/2018 8 8     AST 03/04/2018 14     ALT 03/04/2018 15     Alkaline Phosphatase 03/04/2018 60     Total Protein 03/04/2018 7 4     Albumin 03/04/2018 3 6     Total Bilirubin 03/04/2018 0 63     eGFR 03/04/2018 86     TSH 3RD GENERATON 03/04/2018 4 090*    Color, UA 03/04/2018 Yellow     Clarity, UA 03/04/2018 Clear     Specific Gravity, UA 03/04/2018 1 003     pH, UA 03/04/2018 6 0     Leukocytes, UA 03/04/2018 Small*    Nitrite, UA 03/04/2018 Negative     Protein, UA 03/04/2018 Negative     Glucose, UA 03/04/2018 Negative     Ketones, UA 03/04/2018 Negative     Urobilinogen, UA 03/04/2018 1 0     Bilirubin, UA 03/04/2018 Negative     Blood, UA 03/04/2018 Negative     Amph/Meth UR 03/04/2018 Negative     Barbiturate Ur 03/04/2018 Negative     Benzodiazepine Urine 03/04/2018 Negative     Cocaine Urine 03/04/2018 Negative     Methadone Urine 03/04/2018 Negative     Opiate Urine 03/04/2018 Negative     PCP Ur 03/04/2018 Negative     THC Urine 03/04/2018 Negative     Ethanol Lvl 03/04/2018 <3     Color, UA 03/04/2018 Yellow     Clarity, UA 03/04/2018 Clear     pH, UA 03/04/2018 6 0     Leukocytes, UA 03/04/2018 Small*    Nitrite, UA 03/04/2018 Negative     Protein, UA 03/04/2018 Negative     Glucose, UA 03/04/2018 Negative     Ketones, UA 03/04/2018 Negative     Urobilinogen, UA 03/04/2018 0 2     Bilirubin, UA 03/04/2018 Negative     Blood, UA 03/04/2018 Negative     Specific Gravity, UA 03/04/2018 <=1 005     RBC, UA 03/04/2018 None Seen     WBC, UA 03/04/2018 2-4*    Epithelial Cells 03/04/2018 None Seen     Bacteria, UA 03/04/2018 None Seen     Ventricular Rate 03/04/2018 92     Atrial Rate 03/04/2018 92     AK Interval 03/04/2018 182     QRSD Interval 03/04/2018 80     QT Interval 03/04/2018 352     QTC Interval 03/04/2018 435     P Axis 03/04/2018 74     QRS Axis 03/04/2018 6     T Wave Axis 03/04/2018 64     Cholesterol 03/05/2018 204*    Triglycerides 03/05/2018 111     HDL, Direct 03/05/2018 49     LDL Calculated 03/05/2018 133*    Sodium 03/05/2018 140     Potassium 03/05/2018 3 0*    Chloride 03/05/2018 105     CO2 03/05/2018 29     Anion Gap 03/05/2018 6     BUN 03/05/2018 8     Creatinine 03/05/2018 0 62     Glucose 03/05/2018 108     Calcium 03/05/2018 8 5     AST 03/05/2018 14     ALT 03/05/2018 15     Alkaline Phosphatase 03/05/2018 58     Total Protein 03/05/2018 7 0     Albumin 03/05/2018 3 4*    Total Bilirubin 03/05/2018 0 72     eGFR 03/05/2018 98     RPR 03/05/2018 Non-Reactive        Progress Toward Goals:   No apparent significant progress and I will increase Risperidone by 1mg qhs  Recheck BMP for monitoring of K+ and EKG  Pt may have been missing Levothyroxine doses and will repeat TFT after a few days  Assessment/Plan   Principal Problem:    Schizoaffective disorder, depressive type ([facility] )      Recommended Treatment: Continue with pharmacotherapy, group therapy, milieu therapy and occupational therapy    The patient will be maintained on the following medications:    Current Facility-Administered Medications:  aluminum-magnesium hydroxide-simethicone 30 mL Oral Q4H PRN Eligio Erickson MD   haloperidol 5 mg Oral Q8H PRN Eligio Erickson MD   haloperidol lactate 5 mg Intramuscular Q6H PRN Eligio Erickson, MD   ibuprofen 400 mg Oral Q6H PRN Gretel GOPAL Dennison   ibuprofen 600 mg Oral Q8H PRN Eligio Erickson MD   ibuprofen 800 mg Oral Q8H PRN Eligio Erickson MD   levothyroxine 50 mcg Oral Early Morning Zen Ortega MD   LORazepam 1 mg Oral Q8H PRN Eligio Erickson MD   magnesium hydroxide 30 mL Oral Daily PRN Eligio Erickson MD   potassium chloride 40 mEq Oral Daily Eligio Erickson MD   [START ON 3/7/2018] risperiDONE 1 mg Oral Daily Mary Lou Ervin PA-C   risperiDONE 2 mg Oral HS Mary Lou Ervin PA-C   sertraline 100 mg Oral Daily Zen Ortega MD   traZODone 100 mg Oral HS Zen Ortega MD   traZODone 50 mg Oral HS PRN Eligio Erickson MD       Risks, benefits and possible side effects of Medications:   Risks, benefits, and possible side effects of medications explained to patient and patient verbalizes understanding

## 2018-03-06 NOTE — PROGRESS NOTES
Pt calm and cooperative and continues to be med compliant  Pt denies s/s and SI at current  Pt occasionally reports hearing voices  Pt out in milieu most of day  Will continue to monitor

## 2018-03-06 NOTE — PLAN OF CARE
PSYCHOSIS     Will report no hallucinations or delusions Not Progressing          Alteration in Thoughts and Perception     Treatment Goal: Gain control of psychotic behaviors/thinking, reduce/eliminate presenting symptoms and demonstrate improved reality functioning upon discharge Progressing     Recognize dysfunctional thoughts, communicate reality-based thoughts at the time of discharge [address] Will report anxiety at manageable levels Progressing     By discharge: Patient will verbalize 2 strategies to deal with anxiety Progressing        DEPRESSION     Will be euthymic at discharge Progressing        Ineffective Coping     Participates in unit activities Progressing        Prexisting or High Potential for Compromised Skin Integrity     Skin integrity is maintained or improved Progressing        SELF HARM/SUICIDALITY     Will have no self-injury during hospital stay Progressing

## 2018-03-06 NOTE — CASE MANAGEMENT
Met with pt to initiate DC planning and review her Tx plan  She is , has 3 children but has not had contact with them in years  She lives alone in an apartment and her sister is her primary contact  Pt goes to [address] [place] [address] to see her OP psychiatrist and therapist  She is unsure of who is her psychiatrist but see's Edgardo Valdez for therapy  I have notified that practice of her admission via VM  Pt denies having a POA, does not drive and is dependent on her sister to transport and manage her finances  She gets her scripts filled at [facility] in [place], denies legal issues, denies access to firearms  Admits to Travis Small - of a voice threatening to hurt her when she goes to sleep  Pt has signed SKY for her brother and sister and [facility]

## 2018-03-07 LAB
ANION GAP SERPL CALCULATED.3IONS-SCNC: 6 MMOL/L (ref 4–13)
ATRIAL RATE: 69 BPM
BUN SERPL-MCNC: 9 MG/DL (ref 5–25)
CALCIUM SERPL-MCNC: 8.4 MG/DL (ref 8.3–10.1)
CHLORIDE SERPL-SCNC: 105 MMOL/L (ref 100–108)
CO2 SERPL-SCNC: 31 MMOL/L (ref 21–32)
CREAT SERPL-MCNC: 0.65 MG/DL (ref 0.6–1.3)
GFR SERPL CREATININE-BSD FRML MDRD: 96 ML/MIN/1.73SQ M
GLUCOSE SERPL-MCNC: 107 MG/DL (ref 65–140)
P AXIS: 71 DEGREES
POTASSIUM SERPL-SCNC: 3.1 MMOL/L (ref 3.5–5.3)
PR INTERVAL: 166 MS
QRS AXIS: 13 DEGREES
QRSD INTERVAL: 76 MS
QT INTERVAL: 368 MS
QTC INTERVAL: 394 MS
SODIUM SERPL-SCNC: 142 MMOL/L (ref 136–145)
T WAVE AXIS: 58 DEGREES
VENTRICULAR RATE: 69 BPM

## 2018-03-07 PROCEDURE — 93010 ELECTROCARDIOGRAM REPORT: CPT | Performed by: INTERNAL MEDICINE

## 2018-03-07 PROCEDURE — 80048 BASIC METABOLIC PNL TOTAL CA: CPT | Performed by: PHYSICIAN ASSISTANT

## 2018-03-07 PROCEDURE — 93005 ELECTROCARDIOGRAM TRACING: CPT

## 2018-03-07 PROCEDURE — 99231 SBSQ HOSP IP/OBS SF/LOW 25: CPT | Performed by: PSYCHIATRY & NEUROLOGY

## 2018-03-07 RX ADMIN — SERTRALINE HYDROCHLORIDE 100 MG: 100 TABLET ORAL at 08:19

## 2018-03-07 RX ADMIN — RISPERIDONE 1 MG: 1 TABLET ORAL at 08:19

## 2018-03-07 RX ADMIN — POTASSIUM CHLORIDE 40 MEQ: 1500 TABLET, EXTENDED RELEASE ORAL at 08:19

## 2018-03-07 RX ADMIN — TRAZODONE HYDROCHLORIDE 100 MG: 100 TABLET ORAL at 21:14

## 2018-03-07 RX ADMIN — RISPERIDONE 2 MG: 1 TABLET ORAL at 21:13

## 2018-03-07 RX ADMIN — LEVOTHYROXINE SODIUM 50 MCG: 50 TABLET ORAL at 06:07

## 2018-03-07 NOTE — PROGRESS NOTES
Patient is bright and oriented x 4  Patient denies all symptoms other then AH  Patient states that the voices tell her "lots of things"  Denies command auditory hallucinations  Patient is cooperative with care and engaged in milieu

## 2018-03-07 NOTE — PLAN OF CARE
PSYCHOSIS     Will report no hallucinations or delusions Not Progressing        Patient reports constant AH of voices    Alteration in Thoughts and Perception     Treatment Goal: Gain control of psychotic behaviors/thinking, reduce/eliminate presenting symptoms and demonstrate improved reality functioning upon discharge Progressing     Recognize dysfunctional thoughts, communicate reality-based thoughts at the time of discharge [address] Will report anxiety at manageable levels Progressing     By discharge: Patient will verbalize 2 strategies to deal with anxiety Progressing        DEPRESSION     Will be euthymic at discharge Progressing        Prexisting or High Potential for Compromised Skin Integrity     Skin integrity is maintained or improved Progressing        SELF HARM/SUICIDALITY     Will have no self-injury during hospital stay Progressing

## 2018-03-07 NOTE — PROGRESS NOTES
Pt is visible in milieu interacting with peers  Pt is pleasant and medication compliant  Will continue to monitor

## 2018-03-07 NOTE — CASE MANAGEMENT
Returned a call to Sandrita Friend, pt's sister at [phone]  She was not in and I left a VM letting her know that if she wants to see how Brigid Monson is doing she would be able to call the nurses station and gave her that number

## 2018-03-07 NOTE — PROGRESS NOTES
Progress Note - Behavioral Health     Jarad Sullivan 65 y o  female MRN: [id number]  Unit/Bed#: ZK4 575-01 Encounter: [id number]    Jarad Sullivan was seen for continuing care and reviewed with treatment team   Pt reported feeling "Pretty good  Just the voices that's all "  The voice hallucinations are same intensity--can tell her to sit in her room but do NOT command her to harm herself or others  They can also make her legs hurt without touching her  Pt denies depression, anxiety or visual or tactile hallucinations  Floor team relays she remains medication compliant and pleasant  Yesterday she was visible in the milieu for most of the day and was speaking with peers  She attends groups and participates with prompting/when addressed  specifically  Sleep: Good  Appetite: Good  Medication side effects:  None per Pt  ROS: No complaints per Pt    Labs/Tests:  Reviewed    Mental Status Evaluation:  Appearance:  Dressed, Fair hygiene, eye contact better today, though not constant   Behavior:  Calm, cooperative, pleasant, a little less guarded   Speech:  Soft, clear with normal rate , minimal latency today   Mood:  appears less anxious, a little more relaxed   Affect:  Constricted   Thought Process:  Organized, a little more expressive today, but still a general poverty of thoughts   Associations: Intact associations   Thought Content:  Paranoia seems less intense at this time   Perceptual Disturbances: + AH  Pt does not appear lost in thought/internally pre-occupied today       Risk Potential: Pt presently denies SI or HI    Sensorium:  Place, Day of the week, Month, Year, and person   Memory:  short term memory grossly intact   Consciousness:  alert, awake   Attention: Improved today   Insight:  Limited   Judgment: Limited   Gait/Station: Normal gait/station   Motor Activity: No abnormal movements     Vitals:    03/07/18 0730   BP: 123/66   Pulse: 90   Resp: 18   Temp: 98 °F (36 7 °C)   SpO2: 98% Admission on 03/04/2018   Component Date Value    WBC 03/04/2018 6 47     RBC 03/04/2018 4 84     Hemoglobin 03/04/2018 15 2     Hematocrit 03/04/2018 43 2     MCV 03/04/2018 89     MCH 03/04/2018 31 4     MCHC 03/04/2018 35 2     RDW 03/04/2018 12 7     MPV 03/04/2018 11 4     Platelets 65/88/5040 290     nRBC 03/04/2018 0     Neutrophils Relative 03/04/2018 58     Lymphocytes Relative 03/04/2018 30     Monocytes Relative 03/04/2018 10     Eosinophils Relative 03/04/2018 1     Basophils Relative 03/04/2018 1     Neutrophils Absolute 03/04/2018 3 70     Lymphocytes Absolute 03/04/2018 1 92     Monocytes Absolute 03/04/2018 0 67     Eosinophils Absolute 03/04/2018 0 09     Basophils Absolute 03/04/2018 0 08     Sodium 03/04/2018 140     Potassium 03/04/2018 2 9*    Chloride 03/04/2018 102     CO2 03/04/2018 32     Anion Gap 03/04/2018 6     BUN 03/04/2018 5     Creatinine 03/04/2018 0 75     Glucose 03/04/2018 111     Calcium 03/04/2018 8 8     AST 03/04/2018 14     ALT 03/04/2018 15     Alkaline Phosphatase 03/04/2018 60     Total Protein 03/04/2018 7 4     Albumin 03/04/2018 3 6     Total Bilirubin 03/04/2018 0 63     eGFR 03/04/2018 86     TSH 3RD GENERATON 03/04/2018 4 090*    Color, UA 03/04/2018 Yellow     Clarity, UA 03/04/2018 Clear     Specific Gravity, UA 03/04/2018 1 003     pH, UA 03/04/2018 6 0     Leukocytes, UA 03/04/2018 Small*    Nitrite, UA 03/04/2018 Negative     Protein, UA 03/04/2018 Negative     Glucose, UA 03/04/2018 Negative     Ketones, UA 03/04/2018 Negative     Urobilinogen, UA 03/04/2018 1 0     Bilirubin, UA 03/04/2018 Negative     Blood, UA 03/04/2018 Negative     Amph/Meth UR 03/04/2018 Negative     Barbiturate Ur 03/04/2018 Negative     Benzodiazepine Urine 03/04/2018 Negative     Cocaine Urine 03/04/2018 Negative     Methadone Urine 03/04/2018 Negative     Opiate Urine 03/04/2018 Negative     PCP Ur 03/04/2018 Negative  THC Urine 03/04/2018 Negative     Ethanol Lvl 03/04/2018 <3     Color, UA 03/04/2018 Yellow     Clarity, UA 03/04/2018 Clear     pH, UA 03/04/2018 6 0     Leukocytes, UA 03/04/2018 Small*    Nitrite, UA 03/04/2018 Negative     Protein, UA 03/04/2018 Negative     Glucose, UA 03/04/2018 Negative     Ketones, UA 03/04/2018 Negative     Urobilinogen, UA 03/04/2018 0 2     Bilirubin, UA 03/04/2018 Negative     Blood, UA 03/04/2018 Negative     Specific Gravity, UA 03/04/2018 <=1 005     RBC, UA 03/04/2018 None Seen     WBC, UA 03/04/2018 2-4*    Epithelial Cells 03/04/2018 None Seen     Bacteria, UA 03/04/2018 None Seen     Ventricular Rate 03/04/2018 92     Atrial Rate 03/04/2018 92     MN Interval 03/04/2018 182     QRSD Interval 03/04/2018 80     QT Interval 03/04/2018 352     QTC Interval 03/04/2018 435     P Axis 03/04/2018 74     QRS Axis 03/04/2018 6     T Wave Axis 03/04/2018 64     Cholesterol 03/05/2018 204*    Triglycerides 03/05/2018 111     HDL, Direct 03/05/2018 49     LDL Calculated 03/05/2018 133*    Sodium 03/05/2018 140     Potassium 03/05/2018 3 0*    Chloride 03/05/2018 105     CO2 03/05/2018 29     Anion Gap 03/05/2018 6     BUN 03/05/2018 8     Creatinine 03/05/2018 0 62     Glucose 03/05/2018 108     Calcium 03/05/2018 8 5     AST 03/05/2018 14     ALT 03/05/2018 15     Alkaline Phosphatase 03/05/2018 58     Total Protein 03/05/2018 7 0     Albumin 03/05/2018 3 4*    Total Bilirubin 03/05/2018 0 72     eGFR 03/05/2018 98     RPR 03/05/2018 Non-Reactive     Sodium 03/07/2018 142     Potassium 03/07/2018 3 1*    Chloride 03/07/2018 105     CO2 03/07/2018 31     Anion Gap 03/07/2018 6     BUN 03/07/2018 9     Creatinine 03/07/2018 0 65     Glucose 03/07/2018 107     Calcium 03/07/2018 8 4     eGFR 03/07/2018 96        Progress Toward Goals:  Some slow progress    Risperidone increase was reflected only last night and I will increase further tomorrow as warranted  Will continue to monitor hypokalemia, which has been slowly improving, and will consider an additional supplementary dose tomorrow  Repeat BMP tomorrow    Assessment/Plan   Principal Problem:    Schizoaffective disorder, depressive type ([facility] [address] )      Recommended Treatment: Continue with pharmacotherapy, group therapy, milieu therapy and occupational therapy    The patient will be maintained on the following medications:    Current Facility-Administered Medications:  aluminum-magnesium hydroxide-simethicone 30 mL Oral Q4H PRN Arabella Mercado MD   haloperidol 5 mg Oral Q8H PRN Arabella Mercado MD   haloperidol lactate 5 mg Intramuscular Q6H PRN Arabella Mercado MD   ibuprofen 400 mg Oral Q6H PRN Chiara Soto PA-C   ibuprofen 600 mg Oral Q8H PRN Arabella Mercado MD   ibuprofen 800 mg Oral Q8H PRN Arabella Mercado MD   levothyroxine 50 mcg Oral Early Morning Williams Booker MD   LORazepam 1 mg Oral Q8H PRN Arabella Mercado MD   magnesium hydroxide 30 mL Oral Daily PRN Arabella Mercado MD   potassium chloride 40 mEq Oral Daily Arabella Mercado MD   risperiDONE 1 mg Oral Daily Mary Lou Evrin PA-C   risperiDONE 2 mg Oral HS Mary Lou Ervin PA-C   sertraline 100 mg Oral Daily Williams Booker MD   traZODone 100 mg Oral HS Williams Booker MD   traZODone 50 mg Oral HS PRN Arabella Mercado MD       Risks, benefits and possible side effects of Medications:   Risks, benefits, and possible side effects of medications explained to patient and patient verbalizes understanding

## 2018-03-08 LAB
ALBUMIN SERPL BCP-MCNC: 3 G/DL (ref 3.5–5)
ALP SERPL-CCNC: 50 U/L (ref 46–116)
ALT SERPL W P-5'-P-CCNC: 11 U/L (ref 12–78)
ANION GAP SERPL CALCULATED.3IONS-SCNC: 4 MMOL/L (ref 4–13)
AST SERPL W P-5'-P-CCNC: 11 U/L (ref 5–45)
BILIRUB SERPL-MCNC: 0.43 MG/DL (ref 0.2–1)
BUN SERPL-MCNC: 9 MG/DL (ref 5–25)
CALCIUM SERPL-MCNC: 8.6 MG/DL (ref 8.3–10.1)
CHLORIDE SERPL-SCNC: 105 MMOL/L (ref 100–108)
CO2 SERPL-SCNC: 31 MMOL/L (ref 21–32)
CREAT SERPL-MCNC: 0.57 MG/DL (ref 0.6–1.3)
GFR SERPL CREATININE-BSD FRML MDRD: 100 ML/MIN/1.73SQ M
GLUCOSE SERPL-MCNC: 95 MG/DL (ref 65–140)
POTASSIUM SERPL-SCNC: 3.9 MMOL/L (ref 3.5–5.3)
PROT SERPL-MCNC: 5.9 G/DL (ref 6.4–8.2)
SODIUM SERPL-SCNC: 140 MMOL/L (ref 136–145)

## 2018-03-08 PROCEDURE — 99231 SBSQ HOSP IP/OBS SF/LOW 25: CPT | Performed by: PSYCHIATRY & NEUROLOGY

## 2018-03-08 PROCEDURE — 80053 COMPREHEN METABOLIC PANEL: CPT | Performed by: PHYSICIAN ASSISTANT

## 2018-03-08 RX ORDER — ASENAPINE 5 MG/1
5 TABLET SUBLINGUAL 2 TIMES DAILY
Status: DISCONTINUED | OUTPATIENT
Start: 2018-03-08 | End: 2018-03-09

## 2018-03-08 RX ADMIN — SERTRALINE HYDROCHLORIDE 100 MG: 100 TABLET ORAL at 09:00

## 2018-03-08 RX ADMIN — ASENAPINE MALEATE 5 MG: 5 TABLET SUBLINGUAL at 10:32

## 2018-03-08 RX ADMIN — LEVOTHYROXINE SODIUM 50 MCG: 50 TABLET ORAL at 06:19

## 2018-03-08 RX ADMIN — RISPERIDONE 2 MG: 1 TABLET ORAL at 21:15

## 2018-03-08 RX ADMIN — RISPERIDONE 1 MG: 1 TABLET ORAL at 09:00

## 2018-03-08 RX ADMIN — POTASSIUM CHLORIDE 40 MEQ: 1500 TABLET, EXTENDED RELEASE ORAL at 09:00

## 2018-03-08 RX ADMIN — ASENAPINE MALEATE 5 MG: 5 TABLET SUBLINGUAL at 18:33

## 2018-03-08 RX ADMIN — TRAZODONE HYDROCHLORIDE 100 MG: 100 TABLET ORAL at 21:15

## 2018-03-08 NOTE — PROGRESS NOTES
Pt is bright upon approach and present in the milieu  Pt is cooperative with care and continues to be medication compliant  Pt denies s/s, but continues to have auditory hallucinations  Pt's sister called and advised that 'although the patient does not have voices telling her to harm herself, she has attempted to end her life in the past, due to the voices'  Pt attends group and is selectively social   Will continue to monitor

## 2018-03-08 NOTE — PROGRESS NOTES
Progress Note - [address] [place] 65 y o  female MRN: [id number]  Unit/Bed#: CI7 575-01 Encounter: [id number]    The patient was seen for continuing care and reviewed with treatment team   Reports depressed mood and feeling down in the dumps  She is still troubled by auditory hallucinations  She is still hearing voices of 6 different people all named Ismael Headings who tell her to do different things although not to harm herself or others  Risperidone has not made a difference  Mental Status Evaluation:  Appearance:  Adequate hygiene and grooming and Good eye contact   Behavior:  calm and guarded   Fund of knowledge  Not assessed   Speech:   Language: Normal rate and Normal volume  No overt abnormality   Mood:  depressed   Affect:   Associations: Flat  Tightly connected   Thought Process:  Goal directed and coherent   Thought Content:  Delusions of persecution   Perceptual Disturbances: Auditory hallucinations with commands to go to the bathroom or go to bed at a certain time  No aggressive commands   Risk Potential: No suicidal or homicidal ideation   Orientation  Oriented x 3   Memory Not tested   Attention/Concentration attention span appeared shorter than expected for age   Insight:  limited   Judgment: Good judgment   Gait/Station: normal gait/station and normal balance   Motor Activity: No abnormal movement noted     Progress Toward Goals: no improvement reported    Assessment/Plan    Principal Problem:    Schizoaffective disorder, depressive type ([facility] [address] )      Recommended Treatment: Will start Saphris due to poor response to risperidone  Continue with pharmacotherapy, group therapy, milieu therapy and occupational therapy    The patient will be maintained on the following medications:    Current Facility-Administered Medications:  aluminum-magnesium hydroxide-simethicone 30 mL Oral Q4H PRN Umang Bello MD   asenapine 5 mg Sublingual BID Viktor Louis MD   haloperidol 5 mg Oral Q8H PRN Umang Bello MD   haloperidol lactate 5 mg Intramuscular Q6H PRN Hailey MD Bradley   ibuprofen 400 mg Oral Q6H PRN Joey Elkins PA-C   ibuprofen 600 mg Oral Q8H PRN Hailey Huerta MD   ibuprofen 800 mg Oral Q8H PRN Hailey Huerta, MD   levothyroxine 50 mcg Oral Early Morning Huyen Chung MD   LORazepam 1 mg Oral Q8H PRN Hailey Huerta MD   magnesium hydroxide 30 mL Oral Daily PRN Hailey Huerta MD   potassium chloride 40 mEq Oral Daily Hailey Huerta MD   risperiDONE 2 mg Oral HS Mary Lou Ervin PA-C   sertraline 100 mg Oral Daily Huyen Chung MD   traZODone 100 mg Oral HS Huyen Chung MD   traZODone 50 mg Oral HS PRN Hailey Huerta MD       Risks, benefits and possible side effects of Medications:   Risks, benefits, and possible side effects of medications explained to patient and patient verbalizes understanding

## 2018-03-08 NOTE — PLAN OF CARE
Alteration in Thoughts and Perception     Treatment Goal: Gain control of psychotic behaviors/thinking, reduce/eliminate presenting symptoms and demonstrate improved reality functioning upon discharge Progressing     Recognize dysfunctional thoughts, communicate reality-based thoughts at the time of discharge [address] Will report anxiety at manageable levels Progressing     By discharge: Patient will verbalize 2 strategies to deal with anxiety Progressing        DEPRESSION     Will be euthymic at discharge Progressing        Prexisting or High Potential for Compromised Skin Integrity     Skin integrity is maintained or improved Progressing        PSYCHOSIS     Will report no hallucinations or delusions Progressing        SELF HARM/SUICIDALITY     Will have no self-injury during hospital stay Progressing

## 2018-03-08 NOTE — MEDICAL STUDENT
Progress Note - [address] [place] 65 y o  female MRN: [id number]  Unit/Bed#: RS5 575-01 Encounter: [id number]    Assessment/Plan   Principal Problem:    Schizoaffective disorder, depressive type [facility])      Patient presented calm, pleasant, and cooperative  She reports still having auditory hallucinations with male voices "They are 6 Paul"  Patient denies the name as of any significance to her but continue to say they were all named "Sveta Ewing"  Patient reports she is currently telling her to use the restroom or look at the clock while sitting down on the couch  She denies any other command  Such of telling her to hurt herself or hurt others and denied endorsing any signs of suicidal ideations  She was unaware of current medications but denied any decreased or frequency of the voices  "They started 6 years ago and they don't go away and that is why I am here"  Behavior over the last 24 hours:  unchanged  Sleep: normal  Appetite: normal  Medication side effects: No  ROS: no complaints    Mental Status Evaluation:  Appearance:  casually dressed and older than stated age   Behavior:  psychomotor retardation   Speech:  normal pitch and normal volume   Mood:  constricted   Affect:  blunted   Thought Process:  normal   Thought Content:  delusions  "I hear the 6 voices all the time" "they tell me to do normal staff like going to the bathroom"   Perceptual Disturbances:  Auditory hallucinations with commands "they tell me to use the bathroom and whitney look at the clock for 15 minutes"    Risk Potential: Suicidal Ideations none   Sensorium:  person, place and situation   Cognition:  grossly intact   Consciousness:  alert and awake    Attention: attention span appeared shorter than expected for age   Insight:  fair   Judgment: fair   Gait/Station: slow   Motor Activity: Psychomotor retardation     Progress Toward Goals: slowly improving    Recommended Treatment: Continue with group therapy, milieu therapy and occupational therapy  Risks, benefits and possible side effects of Medications:   Patient does not verbalize understanding at this time and will require further explanation  Medications: all current active meds have been reviewed  Medication Administration - last 24 hours from 03/07/2018 1136 to 03/08/2018 1136       Date/Time Order Dose Route Action Action by     03/08/2018 0900 potassium chloride (K-DUR,KLOR-CON) CR tablet 40 mEq 40 mEq Oral Given Beck Sun RN     03/08/2018 8595 levothyroxine tablet 50 mcg 50 mcg Oral Given Evelyn Mclain RN     03/08/2018 0900 sertraline (ZOLOFT) tablet 100 mg 100 mg Oral Given Beck Sun RN     03/07/2018 2114 traZODone (DESYREL) tablet 100 mg 100 mg Oral Given Katie Hopkins RN     03/08/2018 0900 risperiDONE (RisperDAL) tablet 1 mg 1 mg Oral Given Beck Sun RN     03/07/2018 2113 risperiDONE (RisperDAL) tablet 2 mg 2 mg Oral Given Katie Hopkins RN     03/08/2018 1032 asenapine (SAPHRIS) SL tablet 5 mg 5 mg Sublingual Given Beck Sun RN        Labs:   Lab Results   Component Value Date    WBC 6 47 03/04/2018    HGB 15 2 03/04/2018    HCT 43 2 03/04/2018    MCV 89 03/04/2018     03/04/2018     Lab Results   Component Value Date    GLUCOSE 95 03/08/2018    CALCIUM 8 6 03/08/2018     03/08/2018    K 3 9 03/08/2018    CO2 31 03/08/2018     03/08/2018    BUN 9 03/08/2018    CREATININE 0 57 (L) 03/08/2018         Counseling / Coordination of Care  Total floor / unit time spent today 25 minutes  Greater than 50% of total time was spent with the patient and / or family counseling and / or coordination of care  Plan: Continue risperidone BID  May increase dose to 1 5 mg in the morning and continue with 2 mg at HS to decreased and manage  AH

## 2018-03-08 NOTE — PROGRESS NOTES
Pt visible in the milieu, calm and pleasant upon approach  Pt denies SI/HI/depression and anxiety; reports voice are less and not commanding  Will continue to monitor

## 2018-03-09 PROCEDURE — 99231 SBSQ HOSP IP/OBS SF/LOW 25: CPT | Performed by: PSYCHIATRY & NEUROLOGY

## 2018-03-09 RX ORDER — ASENAPINE 5 MG/1
5 TABLET SUBLINGUAL EVERY MORNING
Status: DISCONTINUED | OUTPATIENT
Start: 2018-03-10 | End: 2018-03-10

## 2018-03-09 RX ORDER — ASENAPINE 10 MG/1
10 TABLET SUBLINGUAL
Status: DISCONTINUED | OUTPATIENT
Start: 2018-03-09 | End: 2018-03-10

## 2018-03-09 RX ORDER — RISPERIDONE 1 MG/1
1 TABLET, FILM COATED ORAL
Status: DISCONTINUED | OUTPATIENT
Start: 2018-03-09 | End: 2018-03-10

## 2018-03-09 RX ADMIN — ASENAPINE MALEATE 5 MG: 5 TABLET SUBLINGUAL at 08:03

## 2018-03-09 RX ADMIN — ASENAPINE MALEATE 10 MG: 10 TABLET SUBLINGUAL at 22:58

## 2018-03-09 RX ADMIN — POTASSIUM CHLORIDE 40 MEQ: 1500 TABLET, EXTENDED RELEASE ORAL at 08:03

## 2018-03-09 RX ADMIN — LEVOTHYROXINE SODIUM 50 MCG: 50 TABLET ORAL at 06:11

## 2018-03-09 RX ADMIN — SERTRALINE HYDROCHLORIDE 100 MG: 100 TABLET ORAL at 08:03

## 2018-03-09 RX ADMIN — RISPERIDONE 1 MG: 1 TABLET ORAL at 22:57

## 2018-03-09 RX ADMIN — TRAZODONE HYDROCHLORIDE 100 MG: 100 TABLET ORAL at 22:57

## 2018-03-09 NOTE — PROGRESS NOTES
Patient states she is "good', denies anxiety or depression but has a flat somewhat depressed affect  Patient reports hearing voices she states they told her to stay in her room all day  Patient states the voices have not told her to harm herself or others  Patient denies SI or HI  Pt denies visual hallucinations  Patient has been social in the milieu  Patient is pleasant and cooperative

## 2018-03-09 NOTE — CASE MANAGEMENT
Received a call from pt's therapist Rivka Fleischer from [address] [place] [address] who called to inform us that pt has appointments on 3/12 at 5:00PM with Dr Salvador Mcgrath and on 3/14 at 8:15 AM  with Rivka Fleischer herself  Spoke to Dr Marisabel Butt and he is supportive of a 3/12 discharge if the next few days go well and pt could then keep her appointment with Dr Salvador Mcgrath and he could support her progress made during this inpatient stay  Placed a call to Raleigh Doan, pt's sister [phone] to inform her of same and she will return my call with transportation plans for 3/12

## 2018-03-09 NOTE — PLAN OF CARE
PSYCHOSIS     Will report no hallucinations or delusions Not Progressing        Patient continues to report AH    Alteration in Thoughts and Perception     Treatment Goal: Gain control of psychotic behaviors/thinking, reduce/eliminate presenting symptoms and demonstrate improved reality functioning upon discharge Progressing     Recognize dysfunctional thoughts, communicate reality-based thoughts at the time of discharge [address] Will report anxiety at manageable levels Progressing     By discharge: Patient will verbalize 2 strategies to deal with anxiety Progressing        DEPRESSION     Will be euthymic at discharge Progressing        Prexisting or High Potential for Compromised Skin Integrity     Skin integrity is maintained or improved Progressing        SELF HARM/SUICIDALITY     Will have no self-injury during hospital stay Progressing

## 2018-03-09 NOTE — PLAN OF CARE
Problem: Ineffective Coping  Goal: Participates in unit activities  Interventions:  - Provide therapeutic environment   - Provide required programming   - Redirect inappropriate behaviors    Outcome: Progressing  Pt  Cooperative and calm yet superficial in her responses of discussions on future goals  She was able to win at [employer] today but refused to take a prize for self  Pt  Attends all groups without prompting

## 2018-03-09 NOTE — PROGRESS NOTES
Progress Note - Behavioral Health     Martin Armstrong 65 y o  female MRN: [id number]  Unit/Bed#: IL0 575-01 Encounter: [id number]    Martin Armstrong was seen for continuing care and reviewed with treatment team   Today Pt reported "I'm upset inside becauae of the voices "  They are essentially unchanged and she presently denies that they are commanding her to harm herself or others  She also denies current SI, HI, anxiety, or visual or tactile hallucinations  Saphris 5mg bid was started yesterday by psychiatrist    Floor team relays Pt seemed brighter with time, otherwise no major changes in the past 24 hours  Pt remains cooperative and medication compliant  She does attend therapy groups but generally only listens to other patients and remains selectively social with peers  Pt had a nice visit with her sister last night and sibling wanted to be sure the staff knew that Татьяна Medina has had suicide attempts in the past due to influence of voice hallucinations  Sleep: Good  Appetite: Good  Medication side effects: None per Pt  ROS: No complaints per Pt    Labs/Tests:  Reviewed  3/8/3018 K+ 3 9     Mental Status Evaluation:  Appearance:  Dressed, Fair hygiene, Fair eye contact   Behavior:  Calm, cooperative, pleasant, A bit guarded   Speech:  Clear, normal rate and volume   Mood:  Dysphoric   Affect:  Blunted   Thought Process:  Organized   Associations: Intact associations   Thought Content:  Paranoid delusions   Perceptual Disturbances: + AH  Pt does not appear to be responding to internal stimuli at this time     Risk Potential: Pt presently denies SI or HI    Sensorium:  Place, Day of the week, Month, Year, and person   Memory:  short term memory grossly intact   Consciousness:  alert, awake   Attention: Fair   Insight:  Limited   Judgment: Limited   Gait/Station: Normal gait/station   Motor Activity: No abnormal movements     Vitals:    03/09/18 0700   BP: 127/67   Pulse: 96   Resp: 16   Temp: 97 7 °F (36 5 °C) SpO2: 94%       Admission on 03/04/2018   Component Date Value    WBC 03/04/2018 6 47     RBC 03/04/2018 4 84     Hemoglobin 03/04/2018 15 2     Hematocrit 03/04/2018 43 2     MCV 03/04/2018 89     MCH 03/04/2018 31 4     MCHC 03/04/2018 35 2     RDW 03/04/2018 12 7     MPV 03/04/2018 11 4     Platelets 04/81/9711 290     nRBC 03/04/2018 0     Neutrophils Relative 03/04/2018 58     Lymphocytes Relative 03/04/2018 30     Monocytes Relative 03/04/2018 10     Eosinophils Relative 03/04/2018 1     Basophils Relative 03/04/2018 1     Neutrophils Absolute 03/04/2018 3 70     Lymphocytes Absolute 03/04/2018 1 92     Monocytes Absolute 03/04/2018 0 67     Eosinophils Absolute 03/04/2018 0 09     Basophils Absolute 03/04/2018 0 08     Sodium 03/04/2018 140     Potassium 03/04/2018 2 9*    Chloride 03/04/2018 102     CO2 03/04/2018 32     Anion Gap 03/04/2018 6     BUN 03/04/2018 5     Creatinine 03/04/2018 0 75     Glucose 03/04/2018 111     Calcium 03/04/2018 8 8     AST 03/04/2018 14     ALT 03/04/2018 15     Alkaline Phosphatase 03/04/2018 60     Total Protein 03/04/2018 7 4     Albumin 03/04/2018 3 6     Total Bilirubin 03/04/2018 0 63     eGFR 03/04/2018 86     TSH 3RD GENERATON 03/04/2018 4 090*    Color, UA 03/04/2018 Yellow     Clarity, UA 03/04/2018 Clear     Specific Gravity, UA 03/04/2018 1 003     pH, UA 03/04/2018 6 0     Leukocytes, UA 03/04/2018 Small*    Nitrite, UA 03/04/2018 Negative     Protein, UA 03/04/2018 Negative     Glucose, UA 03/04/2018 Negative     Ketones, UA 03/04/2018 Negative     Urobilinogen, UA 03/04/2018 1 0     Bilirubin, UA 03/04/2018 Negative     Blood, UA 03/04/2018 Negative     Amph/Meth UR 03/04/2018 Negative     Barbiturate Ur 03/04/2018 Negative     Benzodiazepine Urine 03/04/2018 Negative     Cocaine Urine 03/04/2018 Negative     Methadone Urine 03/04/2018 Negative     Opiate Urine 03/04/2018 Negative     PCP Ur 03/04/2018 Negative     THC Urine 03/04/2018 Negative     Ethanol Lvl 03/04/2018 <3     Color, UA 03/04/2018 Yellow     Clarity, UA 03/04/2018 Clear     pH, UA 03/04/2018 6 0     Leukocytes, UA 03/04/2018 Small*    Nitrite, UA 03/04/2018 Negative     Protein, UA 03/04/2018 Negative     Glucose, UA 03/04/2018 Negative     Ketones, UA 03/04/2018 Negative     Urobilinogen, UA 03/04/2018 0 2     Bilirubin, UA 03/04/2018 Negative     Blood, UA 03/04/2018 Negative     Specific Gravity, UA 03/04/2018 <=1 005     RBC, UA 03/04/2018 None Seen     WBC, UA 03/04/2018 2-4*    Epithelial Cells 03/04/2018 None Seen     Bacteria, UA 03/04/2018 None Seen     Ventricular Rate 03/04/2018 92     Atrial Rate 03/04/2018 92     GA Interval 03/04/2018 182     QRSD Interval 03/04/2018 80     QT Interval 03/04/2018 352     QTC Interval 03/04/2018 435     P Axis 03/04/2018 74     QRS Axis 03/04/2018 6     T Wave Axis 03/04/2018 64     Cholesterol 03/05/2018 204*    Triglycerides 03/05/2018 111     HDL, Direct 03/05/2018 49     LDL Calculated 03/05/2018 133*    Sodium 03/05/2018 140     Potassium 03/05/2018 3 0*    Chloride 03/05/2018 105     CO2 03/05/2018 29     Anion Gap 03/05/2018 6     BUN 03/05/2018 8     Creatinine 03/05/2018 0 62     Glucose 03/05/2018 108     Calcium 03/05/2018 8 5     AST 03/05/2018 14     ALT 03/05/2018 15     Alkaline Phosphatase 03/05/2018 58     Total Protein 03/05/2018 7 0     Albumin 03/05/2018 3 4*    Total Bilirubin 03/05/2018 0 72     eGFR 03/05/2018 98     RPR 03/05/2018 Non-Reactive     Sodium 03/07/2018 142     Potassium 03/07/2018 3 1*    Chloride 03/07/2018 105     CO2 03/07/2018 31     Anion Gap 03/07/2018 6     BUN 03/07/2018 9     Creatinine 03/07/2018 0 65     Glucose 03/07/2018 107     Calcium 03/07/2018 8 4     eGFR 03/07/2018 96     Ventricular Rate 03/07/2018 69     Atrial Rate 03/07/2018 69     GA Interval 03/07/2018 166     QRSD Interval 03/07/2018 76     QT Interval 03/07/2018 368     QTC Interval 03/07/2018 394     P Axis 03/07/2018 71     QRS Axis 03/07/2018 13     T Wave Axis 03/07/2018 58     Sodium 03/08/2018 140     Potassium 03/08/2018 3 9     Chloride 03/08/2018 105     CO2 03/08/2018 31     Anion Gap 03/08/2018 4     BUN 03/08/2018 9     Creatinine 03/08/2018 0 57*    Glucose 03/08/2018 95     Calcium 03/08/2018 8 6     AST 03/08/2018 11     ALT 03/08/2018 11*    Alkaline Phosphatase 03/08/2018 50     Total Protein 03/08/2018 5 9*    Albumin 03/08/2018 3 0*    Total Bilirubin 03/08/2018 0 43     eGFR 03/08/2018 100        Progress Toward Goals:  No change and I will increase Saphris to 10mg qHS and continue the 5mg qAM dose for psychosis, and further taper Risperidone to 1mg qhs  Potassium has normalized and will continue her usual dose of D-Dur at present and repeat BMP tomorrow  Assessment/Plan   Principal Problem:    Schizoaffective disorder, depressive type ([facility] [address] )      Recommended Treatment: Continue with pharmacotherapy, group therapy, milieu therapy and occupational therapy    The patient will be maintained on the following medications:    Current Facility-Administered Medications:  aluminum-magnesium hydroxide-simethicone 30 mL Oral Q4H PRN Gabe Colon MD   asenapine 10 mg Sublingual HS Mary Lou Ervin PA-C   [START ON 3/10/2018] asenapine 5 mg Sublingual QAM Mary Lou Ervin PA-C   haloperidol 5 mg Oral Q8H PRN Gabe Colon MD   haloperidol lactate 5 mg Intramuscular Q6H PRN Gabe Colon MD   ibuprofen 400 mg Oral Q6H PRN Ary Cockayne, PA-C   ibuprofen 600 mg Oral Q8H PRN Gabe Colon MD   ibuprofen 800 mg Oral Q8H PRN Gabe Colon MD   levothyroxine 50 mcg Oral Early Morning Blaine Chanel MD   LORazepam 1 mg Oral Q8H PRN Gabe Colon MD   magnesium hydroxide 30 mL Oral Daily PRN Gabe Colon MD   potassium chloride 40 mEq Oral Daily Gabe Colon MD   risperiDONE 1 mg Oral HS Mary Lou Ervin PA-C sertraline 100 mg Oral Daily Kalpana Laws MD   traZODone 100 mg Oral HS Kalpana Laws MD   traZODone 50 mg Oral HS PRN Maedline Chavarria MD       Risks, benefits and possible side effects of Medications:   Risks, benefits, and possible side effects of medications explained to patient and patient verbalizes understanding

## 2018-03-09 NOTE — PROGRESS NOTES
Pt calm, cooperative, bright upon approach  Denies all s/s except hearing voices  Out on the unit and med compliant

## 2018-03-10 LAB
ANION GAP SERPL CALCULATED.3IONS-SCNC: 4 MMOL/L (ref 4–13)
BUN SERPL-MCNC: 9 MG/DL (ref 5–25)
CALCIUM SERPL-MCNC: 8.3 MG/DL (ref 8.3–10.1)
CHLORIDE SERPL-SCNC: 109 MMOL/L (ref 100–108)
CO2 SERPL-SCNC: 29 MMOL/L (ref 21–32)
CREAT SERPL-MCNC: 0.56 MG/DL (ref 0.6–1.3)
GFR SERPL CREATININE-BSD FRML MDRD: 101 ML/MIN/1.73SQ M
GLUCOSE SERPL-MCNC: 96 MG/DL (ref 65–140)
POTASSIUM SERPL-SCNC: 4.3 MMOL/L (ref 3.5–5.3)
SODIUM SERPL-SCNC: 142 MMOL/L (ref 136–145)

## 2018-03-10 PROCEDURE — 80048 BASIC METABOLIC PNL TOTAL CA: CPT | Performed by: PHYSICIAN ASSISTANT

## 2018-03-10 PROCEDURE — 99231 SBSQ HOSP IP/OBS SF/LOW 25: CPT | Performed by: PSYCHIATRY & NEUROLOGY

## 2018-03-10 RX ORDER — ASENAPINE 10 MG/1
10 TABLET SUBLINGUAL 2 TIMES DAILY
Status: DISCONTINUED | OUTPATIENT
Start: 2018-03-10 | End: 2018-03-19 | Stop reason: HOSPADM

## 2018-03-10 RX ORDER — RISPERIDONE 0.25 MG/1
0.5 TABLET, FILM COATED ORAL
Status: DISCONTINUED | OUTPATIENT
Start: 2018-03-10 | End: 2018-03-11

## 2018-03-10 RX ADMIN — SERTRALINE HYDROCHLORIDE 100 MG: 100 TABLET ORAL at 08:52

## 2018-03-10 RX ADMIN — TRAZODONE HYDROCHLORIDE 100 MG: 100 TABLET ORAL at 22:00

## 2018-03-10 RX ADMIN — ASENAPINE MALEATE 10 MG: 10 TABLET SUBLINGUAL at 17:09

## 2018-03-10 RX ADMIN — LEVOTHYROXINE SODIUM 50 MCG: 50 TABLET ORAL at 06:07

## 2018-03-10 RX ADMIN — RISPERIDONE 0.5 MG: 0.25 TABLET ORAL at 22:00

## 2018-03-10 RX ADMIN — POTASSIUM CHLORIDE 40 MEQ: 1500 TABLET, EXTENDED RELEASE ORAL at 08:52

## 2018-03-10 RX ADMIN — ASENAPINE MALEATE 5 MG: 10 TABLET SUBLINGUAL at 08:51

## 2018-03-10 NOTE — PROGRESS NOTES
Progress Note - [facility] 65 y o  female MRN: [id number]  Unit/Bed#: KK2 575-01 Encounter: [id number]    The patient was seen for continuing care and reviewed with treatment team   She continues to have auditory hallucinations but she cannot recognize what is being said  She has been sleeping and eating well  She appeared to be somewhat restless, bouncing her legs while sitting    Mental Status Evaluation:  Appearance:  Adequate hygiene and grooming and Good eye contact   Behavior:  guarded and restless and fidgety   Fund of knowledge  Not assessed   Speech:   Language: Normal rate and Normal volume  No overt abnormality   Mood:  depressed   Affect:   Associations: Flat  Tightly connected   Thought Process:  Goal directed and coherent   Thought Content:  Does not verbalize delusional material   Perceptual Disturbances: Auditory hallucinations without commands   Risk Potential: No suicidal or homicidal ideation   Orientation  Oriented x 3   Memory grossly intact   Attention/Concentration attention span appeared shorter than expected for age   Insight:  limited   Judgment: Good judgment   Gait/Station: normal gait/station and normal balance   Motor Activity: No abnormal movement noted     Progress Toward Goals: no change    Assessment/Plan    Principal Problem:    Schizoaffective disorder, depressive type ([facility] [address] )      Recommended Treatment: We will continue to cross titrate risperidone and asenapine Continue with pharmacotherapy, group therapy, milieu therapy and occupational therapy    The patient will be maintained on the following medications:    Current Facility-Administered Medications:  aluminum-magnesium hydroxide-simethicone 30 mL Oral Q4H PRN Emmanuel Giordano MD   asenapine 10 mg Sublingual BID Gabby Wynn MD   haloperidol 5 mg Oral Q8H PRN Emmanuel Giordano MD   haloperidol lactate 5 mg Intramuscular Q6H PRN Emmanuel Giordano MD   ibuprofen 400 mg Oral Q6H PRN Leah Ly PA-C   ibuprofen 600 mg Oral Q8H PRN Gallito Fernando MD   ibuprofen 800 mg Oral Q8H PRN Gallito Fernando MD   levothyroxine 50 mcg Oral Early Morning Andi Espinoza MD   LORazepam 1 mg Oral Q8H PRN Gallito Fernando MD   magnesium hydroxide 30 mL Oral Daily PRN Gallito Fernando MD   potassium chloride 40 mEq Oral Daily Gallito Fernando MD   risperiDONE 0 5 mg Oral HS Andi Espinoza MD   sertraline 100 mg Oral Daily Andi Espinoza MD   traZODone 100 mg Oral HS Andi Espinoza MD   traZODone 50 mg Oral HS PRN Gallito Fernando MD       Risks, benefits and possible side effects of Medications:   Risks, benefits, and possible side effects of medications explained to patient and patient verbalizes understanding

## 2018-03-10 NOTE — PLAN OF CARE
PSYCHOSIS     Will report no hallucinations or delusions Not Progressing        Continued auditory hallucinations    Alteration in Thoughts and Perception     Treatment Goal: Gain control of psychotic behaviors/thinking, reduce/eliminate presenting symptoms and demonstrate improved reality functioning upon discharge Progressing     Recognize dysfunctional thoughts, communicate reality-based thoughts at the time of discharge [address] Will report anxiety at manageable levels Progressing     By discharge: Patient will verbalize 2 strategies to deal with anxiety Progressing        DEPRESSION     Will be euthymic at discharge Progressing        Prexisting or High Potential for Compromised Skin Integrity     Skin integrity is maintained or improved Progressing        SELF HARM/SUICIDALITY     Will have no self-injury during hospital stay Progressing

## 2018-03-10 NOTE — PROGRESS NOTES
Pt admits to [facility], but denies SI/HI/VH-Depression and anxiety  When asked by writer if she feels she's in control, she said no  Would not elaborate  Medication compliant  Cooperative with care

## 2018-03-10 NOTE — PROGRESS NOTES
Patient states she is "good"  Patient reports having AH of voices states, "they want me to stay in my room"  Patient denies command hallucinations that tell her to hurt herself or others  Patient denies SI, HI, and VH  Patient is present in milieu and engaged in milieu  Patient has been calm and cooperative with care

## 2018-03-11 PROCEDURE — 99231 SBSQ HOSP IP/OBS SF/LOW 25: CPT | Performed by: PSYCHIATRY & NEUROLOGY

## 2018-03-11 RX ADMIN — TRAZODONE HYDROCHLORIDE 100 MG: 100 TABLET ORAL at 21:33

## 2018-03-11 RX ADMIN — POTASSIUM CHLORIDE 40 MEQ: 1500 TABLET, EXTENDED RELEASE ORAL at 08:32

## 2018-03-11 RX ADMIN — ASENAPINE MALEATE 10 MG: 10 TABLET SUBLINGUAL at 08:33

## 2018-03-11 RX ADMIN — SERTRALINE HYDROCHLORIDE 100 MG: 100 TABLET ORAL at 08:33

## 2018-03-11 RX ADMIN — LEVOTHYROXINE SODIUM 50 MCG: 50 TABLET ORAL at 06:29

## 2018-03-11 RX ADMIN — ASENAPINE MALEATE 10 MG: 10 TABLET SUBLINGUAL at 17:00

## 2018-03-11 NOTE — PROGRESS NOTES
Patient's affect is bright  Patient is currently in the dayroom  Patient states she is "good" and "feels better"  Denies anxiety, depression, SI, HI, VH and pain  Patient reports having AH states "they have something planned for later but I don't know what it was"  Patient denies having questions or concerns at this time  States she is "bored" was provided with recreational activity  Patient is calm and cooperative with care

## 2018-03-11 NOTE — PROGRESS NOTES
Progress Note - [address] 65 y o  female MRN: [id number]  Unit/Bed#: AA0 575-01 Encounter: [id number]    The patient was seen for continuing care and reviewed with treatment team   The patient does not report any improvement since Saphris was started  She also does not report worsening of her symptoms since be reduced risperidone dose  She continues to hear voices but is hesitant to reveal the content of these hallucinations  Sleep and appetite are normal and she participates in milieu activities although she assumes a peripheral role    Mental Status Evaluation:  Appearance:  Adequate hygiene and grooming and Good eye contact   Behavior:  cooperative and restless and fidgety   Fund of knowledge  Not assessed   Speech:   Language: Normal rate and Normal volume  No overt abnormality   Mood:  euthymic   Affect:   Associations: Flat  Tightly connected   Thought Process:  Goal directed and coherent   Thought Content:  Does not verbalize delusional material   Perceptual Disturbances: Auditory hallucinations without commands   Risk Potential: No suicidal or homicidal ideation   Orientation  Oriented x 3   Memory Not tested   Attention/Concentration attention span appeared shorter than expected for age   Insight:  limited   Judgment: Good judgment   Gait/Station: normal gait/station and normal balance   Motor Activity: No abnormal movement noted     Progress Toward Goals:  No improvement    Assessment/Plan    Principal Problem:    Schizoaffective disorder, depressive type ([facility] [address] )      Recommended Treatment: Continue with pharmacotherapy, group therapy, milieu therapy and occupational therapy    The patient will be maintained on the following medications:    Current Facility-Administered Medications:  aluminum-magnesium hydroxide-simethicone 30 mL Oral Q4H PRYESIKA Fernando MD   asenapine 10 mg Sublingual BID Andi Espinoza MD   haloperidol 5 mg Oral Q8H PRYESIKA Fernando MD   haloperidol lactate 5 mg Intramuscular Q6H PRN Colton Mcadams MD   ibuprofen 400 mg Oral Q6H PRN Neri Yadav PA-C   ibuprofen 600 mg Oral Q8H PRN Colton Mcadams MD   ibuprofen 800 mg Oral Q8H PRN Colton Mcadams MD   levothyroxine 50 mcg Oral Early Morning Itzel Jim MD   LORazepam 1 mg Oral Q8H PRN Colton Mcadams MD   magnesium hydroxide 30 mL Oral Daily PRN Colton Mcadams MD   potassium chloride 40 mEq Oral Daily Colton Mcadams MD   sertraline 100 mg Oral Daily Itzel Jim MD   traZODone 100 mg Oral HS Itzel Jim MD   traZODone 50 mg Oral HS PRN Colton Mcadams MD       Risks, benefits and possible side effects of Medications:   Risks, benefits, and possible side effects of medications explained to patient and patient verbalizes understanding

## 2018-03-11 NOTE — PLAN OF CARE
PSYCHOSIS     Will report no hallucinations or delusions Not Progressing        Patient reports auditory hallucinations     Alteration in Thoughts and Perception     Treatment Goal: Gain control of psychotic behaviors/thinking, reduce/eliminate presenting symptoms and demonstrate improved reality functioning upon discharge Progressing     Recognize dysfunctional thoughts, communicate reality-based thoughts at the time of discharge [address] Will report anxiety at manageable levels Progressing     By discharge: Patient will verbalize 2 strategies to deal with anxiety Progressing        DEPRESSION     Will be euthymic at discharge Progressing        Prexisting or High Potential for Compromised Skin Integrity     Skin integrity is maintained or improved Progressing        SELF HARM/SUICIDALITY     Will have no self-injury during hospital stay Progressing

## 2018-03-12 PROCEDURE — 99231 SBSQ HOSP IP/OBS SF/LOW 25: CPT | Performed by: PSYCHIATRY & NEUROLOGY

## 2018-03-12 RX ORDER — POTASSIUM CHLORIDE 20 MEQ/1
20 TABLET, EXTENDED RELEASE ORAL DAILY
Qty: 60 TABLET | Refills: 1 | Status: SHIPPED | OUTPATIENT
Start: 2018-03-13 | End: 2018-03-19

## 2018-03-12 RX ORDER — LEVOTHYROXINE SODIUM 0.05 MG/1
50 TABLET ORAL DAILY
Qty: 30 TABLET | Refills: 1 | Status: SHIPPED | OUTPATIENT
Start: 2018-03-12 | End: 2018-04-11

## 2018-03-12 RX ORDER — POTASSIUM CHLORIDE 20 MEQ/1
40 TABLET, EXTENDED RELEASE ORAL DAILY
Qty: 30 TABLET | Refills: 1 | Status: SHIPPED | OUTPATIENT
Start: 2018-03-13 | End: 2018-03-12

## 2018-03-12 RX ORDER — ASENAPINE 10 MG/1
10 TABLET SUBLINGUAL 2 TIMES DAILY
Qty: 60 TABLET | Refills: 1 | Status: SHIPPED | OUTPATIENT
Start: 2018-03-12 | End: 2018-03-19

## 2018-03-12 RX ORDER — HALOPERIDOL 2 MG/1
2 TABLET ORAL
Status: DISCONTINUED | OUTPATIENT
Start: 2018-03-12 | End: 2018-03-14

## 2018-03-12 RX ORDER — SERTRALINE HYDROCHLORIDE 100 MG/1
100 TABLET, FILM COATED ORAL DAILY
Qty: 30 TABLET | Refills: 1 | Status: SHIPPED | OUTPATIENT
Start: 2018-03-12 | End: 2018-03-19

## 2018-03-12 RX ORDER — TRAZODONE HYDROCHLORIDE 100 MG/1
100 TABLET ORAL
Qty: 30 TABLET | Refills: 1 | Status: SHIPPED | OUTPATIENT
Start: 2018-03-12 | End: 2018-03-19

## 2018-03-12 RX ADMIN — POTASSIUM CHLORIDE 40 MEQ: 1500 TABLET, EXTENDED RELEASE ORAL at 08:50

## 2018-03-12 RX ADMIN — ASENAPINE MALEATE 10 MG: 10 TABLET SUBLINGUAL at 18:06

## 2018-03-12 RX ADMIN — HALOPERIDOL 2 MG: 2 TABLET ORAL at 21:25

## 2018-03-12 RX ADMIN — TRAZODONE HYDROCHLORIDE 100 MG: 100 TABLET ORAL at 21:26

## 2018-03-12 RX ADMIN — SERTRALINE HYDROCHLORIDE 100 MG: 100 TABLET ORAL at 08:50

## 2018-03-12 RX ADMIN — LEVOTHYROXINE SODIUM 50 MCG: 50 TABLET ORAL at 05:45

## 2018-03-12 RX ADMIN — ASENAPINE MALEATE 10 MG: 10 TABLET SUBLINGUAL at 08:51

## 2018-03-12 NOTE — PROGRESS NOTES
Pt's d/c cancelled for this evening  Pt heard screaming in her room at her sister  Redirectable  Will continue to monitor

## 2018-03-12 NOTE — DISCHARGE INSTRUCTIONS
Schizoaffective Disorder   WHAT YOU NEED TO KNOW:   Schizoaffective disorder is a long-term mental illness that may change how you think, feel, and act around others  You may not know what is real and what is not real    DISCHARGE INSTRUCTIONS:   Medicines:   · Antipsychotics: These medicines help decrease psychotic symptoms or severe agitation  You may need antiparkinson medicine to control muscle stiffness, twitches, and restlessness caused by antipsychotic medicines  · Antianxiety medicine: This medicine may be given to decrease anxiety and help you feel calm and relaxed  · Antidepressants: These medicines are given to decrease or stop the symptoms of depression, anxiety, and behavior problems  · Mood stabilizers: These medicines help control mood swings  · Anticonvulsants: This medicine is given to control seizures  It may also be used to decrease violent behavior and control your mood swings  · Blood pressure medicines: These may be used to help decrease motor tics (uncontrolled movements)  They may also help you feel calmer, more focused, and less irritable  · Anticholinergics: This medicine decreases the side effects of other medicines  · Take your medicine as directed  Contact your healthcare provider if you think your medicine is not helping or if you have side effects  Tell him or her if you are allergic to any medicine  Keep a list of the medicines, vitamins, and herbs you take  Include the amounts, and when and why you take them  Bring the list or the pill bottles to follow-up visits  Carry your medicine list with you in case of an emergency  Follow up with your healthcare provider or psychiatrist as directed: You may need to return to have your blood pressure and other symptoms checked  You may need blood tests to check the level of medicine in your blood  Write down your questions so you remember to ask them during your visits    Manage your symptoms: The following may help you feel better or prevent symptoms of schizoaffective disorder from coming back:  · Find support for yourself and your family:  Talk with others to help you cope with your illness better  This may also help to improve how you relate to others  · Keep all medical appointments: This will help manage your disease and the side-effects from medicines you may be taking  · Use your medicines as directed:  Put your medicines in a pillbox placed in an area you can easily see  Use a watch with an alarm to help you remember when it is time to take your medicine  Tell your healthcare provider if you know or think you might be pregnant  Do not stop taking your medicines without your healthcare provider's okay  A sudden stop can cause serious medical problems  · Watch for early signs of a relapse and seek help immediately:      ¨ How you think, feel, and see things has changed  ¨ You behave differently than usual     ¨ You become more nervous and upset, but do not know why  ¨ You eat less and have trouble sleeping  ¨ You have little or no interest in friends or activities  For support and more information:   · [employer]  [facility], [facility], [place] [address] , [address]  Phone: [phone]  Phone: [phone]  Web Address: [employer] br  org  · [address] [place] [address], Public Information & Communication Branch  [address] [place] [address], [address] [facility], [address]  [place]fa [place] [address]   Phone: [phone]  Phone: [phone]  Web Address: [facility]  Contact your healthcare provider or psychiatrist if:   · You think you are having a relapse  · You are having side effects from your medicine, or they are not helping  · You are not sleeping well or are sleeping more than usual     · You cannot eat or are eating more than usual     · You have muscle spasms, stiffness, or trouble walking      · Your sad feelings or thoughts change the way you function during the day  · You have questions or concerns about your condition or care  Seek care immediately or call 911 if:   · You feel like hurting or killing yourself or others  · You feel that your condition is getting worse  · You feel very upset, threaten someone, or you feel violent  · You suddenly have changes in your vision  · You suddenly have chest pain, trouble breathing, or a fever  © 2017 [address]  Information is for End User's use only and may not be sold, redistributed or otherwise used for commercial purposes  All illustrations and images included in CareNotes® are the copyrighted property of A D A M [employer]  or Rj Hardy  The above information is an  only  It is not intended as medical advice for individual conditions or treatments  Talk to your doctor, nurse or pharmacist before following any medical regimen to see if it is safe and effective for you  Asenapine (By mouth)   Asenapine ([id number])  Treats schizophrenia and bipolar disorder  Brand Name(s): Saphris Saphdayan Ruiz   There may be other brand names for this medicine  When This Medicine Should Not Be Used: This medicine is not right for everyone  Do not use it if you had an allergic reaction to asenapine  How to Use This Medicine:   Tablet  · Take your medicine as directed  Your dose may need to be changed several times to find what works best for you  · Dry your hands before you handle the tablet  · Do not open the blister pack that contains the tablet until you are ready to take the medicine  Peel back the foil, then remove the tablet  Do not push the tablet through the foil  · Place the tablet under your tongue and let it melt  Do not split, crush, chew, or swallow the tablet  · Do not eat or drink anything for at least 10 minutes after you take this medicine    · Read and follow the patient instructions that come with this medicine  Talk to your doctor or pharmacist if you have any questions  · Missed dose: Take a dose as soon as you remember  If it is almost time for your next dose, wait until then and take a regular dose  Do not take extra medicine to make up for a missed dose  · Store the medicine in a closed container at room temperature, away from heat, moisture, and direct light  Drugs and Foods to Avoid:   Ask your doctor or pharmacist before using any other medicine, including over-the-counter medicines, vitamins, and herbal products  · Some medicines can affect how asenapine works  Tell your doctor if you are using any of the following:   ¨ Chlorpromazine, fluvoxamine, gatifloxacin, moxifloxacin, paroxetine, thioridazine, ziprasidone  ¨ Blood pressure medicine  ¨ Heart rhythm medicine (including amiodarone, procainamide, quinidine, sotalol)  · Tell your doctor if you use anything else that makes you sleepy  Some examples are allergy medicine, narcotic pain medicine, and alcohol  Warnings While Using This Medicine:   · Tell your doctor if you are pregnant or breastfeeding, or if you have liver disease, diabetes, high cholesterol, trouble swallowing, or a history of seizures  Tell your doctor if you have a blood vessel or heart problem, including low blood pressure, heart failure, heart rhythm problems, or a history of heart attack or stroke  · This medicine may cause the following problems:   ¨ Neuroleptic malignant syndrome (NMS), which can be life-threatening  ¨ Tardive dyskinesia (a movement disorder) that may not go away after you stop using this medicine  ¨ Heart rhythm changes, such as QT prolongation  ¨ Increased risk for infections  · This medicine may make you dizzy or drowsy  Do not drive or do anything that could be dangerous until you know how this medicine affects you  Stand up slowly  You may feel lightheaded if you get up suddenly from a lying or sitting position    · You might get overheated while you are taking this medicine  Drink more water during hot weather, and while you exercise  · Your doctor will check your progress and the effects of this medicine at regular visits  Keep all appointments  · Keep all medicine out of the reach of children  Never share your medicine with anyone  Possible Side Effects While Using This Medicine:   Call your doctor right away if you notice any of these side effects:  · Allergic reaction: Itching or hives, swelling in your face or hands, swelling or tingling in your mouth or throat, chest tightness, trouble breathing  · Fast, slow, pounding, or uneven heartbeat  · Fever, chills, cough, sore throat, and body aches  · Increased hunger or thirst, frequent urination, tiredness, blurred vision  · Jerky muscle movement you cannot control (often in your face, tongue, or jaw), problems with balance or walking  · Lightheadedness, dizziness, drowsiness, or fainting  · Mood or behavior changes, or thoughts of hurting yourself or others  · Numbness or tingling in your mouth or throat, sores or blisters in your mouth  · Seizures, tremors, or twitching  · Sweating, confusion, muscle stiffness  If you notice these less serious side effects, talk with your doctor:   · Headache  · Nipple discharge, breast swelling (even in men)  · Weight gain  If you notice other side effects that you think are caused by this medicine, tell your doctor  Call your doctor for medical advice about side effects  You may report side effects to FDA at 9-545-FDA-3161  © 2017 [address] [employer]  Information is for End User's use only and may not be sold, redistributed or otherwise used for commercial purposes  The above information is an  only  It is not intended as medical advice for individual conditions or treatments  Talk to your doctor, nurse or pharmacist before following any medical regimen to see if it is safe and effective for you        Sertraline (By mouth) Sertraline (SER-tra-timur)  Treats depression, obsessive-compulsive disorder (OCD), posttraumatic stress disorder (PTSD), premenstrual dysphoric disorder (PMDD), social anxiety disorder, and panic disorder  This medicine is an SSRI  Brand Name(s): Zoloft   There may be other brand names for this medicine  When This Medicine Should Not Be Used: This medicine is not right for everyone  Do not use it if you had an allergic reaction to sertraline  How to Use This Medicine:   Liquid, Tablet  · Take your medicine as directed  Your dose may need to be changed several times to find what works best for you  You may need to take it for a few weeks or months before you feel better  · Oral liquid: Use the dropper provided to remove the medicine and mix it with 1/2 cup (4 ounces) of water, ginger ale, lemon-lime soda, lemonade, or orange juice  Drink the mixture right away  It is normal for it to look a bit hazy  · This medicine should come with a Medication Guide  Ask your pharmacist for a copy if you do not have one  · Missed dose: Take a dose as soon as you remember  If it is almost time for your next dose, wait until then and take a regular dose  Do not take extra medicine to make up for a missed dose  · Store the medicine in a closed container at room temperature, away from heat, moisture, and direct light  Drugs and Foods to Avoid:   Ask your doctor or pharmacist before using any other medicine, including over-the-counter medicines, vitamins, and herbal products  · Do not use this medicine together with pimozide  Do not use this medicine and an MAO inhibitor (MAOI) within 14 days of each other  Do not use the oral liquid form of sertraline if you are also using disulfiram   · Some medicines can affect how sertraline works   Tell your doctor if you are using the following:   ¨ Buspirone, cimetidine, cisapride, diazepam, digitoxin, fentanyl, flecainide, lithium, phenytoin, propafenone, Ander's wort, tramadol, tryptophan supplements, or valproate  ¨ A blood thinner (such as warfarin), a diuretic (water pill), an NSAID pain or arthritis medicine (such as aspirin, diclofenac, ibuprofen), a tricyclic antidepressant, a triptan medicine for migraine headaches  · Do not drink alcohol while you are using this medicine  Warnings While Using This Medicine:   · Tell your doctor if you are pregnant or breastfeeding, or if you have liver disease, bleeding problems, glaucoma, heart disease, or a seizure disorder  · For some children, teenagers, and young adults, this medicine may increase mental or emotional problems  This may lead to thoughts of suicide and violence  Talk with your doctor right away if you have any thoughts or behavior changes that concern you  Tell your doctor if you or anyone in your family has a history of bipolar disorder or suicide attempts  · This medicine may cause the following problems:   ¨ Serotonin syndrome (when taken with certain medicines)  ¨ Low sodium levels (more common in elderly patients and those who take diuretics or become dehydrated)  · Tell your doctor if you are sensitive to latex, because the oral liquid comes with a latex rubber dropper  · This medicine may make you dizzy or drowsy  Do not drive or do anything that could be dangerous until you know how this medicine affects you  · Do not stop using this medicine suddenly  Your doctor will need to slowly decrease your dose before you stop it completely  · Your doctor will check your progress and the effects of this medicine at regular visits  Keep all appointments  · Keep all medicine out of the reach of children  Never share your medicine with anyone    Possible Side Effects While Using This Medicine:   Call your doctor right away if you notice any of these side effects:  · Allergic reaction: Itching or hives, swelling in your face or hands, swelling or tingling in your mouth or throat, chest tightness, trouble breathing  · Anxiety, restlessness, fast heartbeat, fever, sweating, muscle spasms, twitching, nausea, vomiting, diarrhea, seeing or hearing things that are not there  · Blistering, peeling, or red skin rash  · Confusion, weakness, and muscle twitching  · Eye pain, vision changes, seeing halos around lights  · Feeling more excited or energetic than usual  · Thoughts of hurting yourself or others, unusual behavior  · Unusual bleeding or bruising  If you notice these less serious side effects, talk with your doctor:   · Dry mouth  · Loss of appetite, weight loss  · Mild diarrhea, constipation, nausea, vomiting  · Sexual problems  · Sleepiness, or trouble sleeping  If you notice other side effects that you think are caused by this medicine, tell your doctor  Call your doctor for medical advice about side effects  You may report side effects to FDA at 6-554-FDA-9758  © 2017 [address]  Information is for End User's use only and may not be sold, redistributed or otherwise used for commercial purposes  The above information is an  only  It is not intended as medical advice for individual conditions or treatments  Talk to your doctor, nurse or pharmacist before following any medical regimen to see if it is safe and effective for you  Trazodone (By mouth)   Trazodone (TRAZ-oh-done)  Treats depression  Brand Name(s): Oleptro   There may be other brand names for this medicine  When This Medicine Should Not Be Used: This medicine is not right for everyone  Do not use it if you had an allergic reaction to trazodone  How to Use This Medicine:   Tablet, Long Acting Tablet  · Take your medicine as directed  Your dose may need to be changed several times to find what works best for you  · Regular tablet: Take it with or shortly after a meal or light snack  · Extended-release tablet: Take it at the same time each day, preferably at bedtime, without food    · The tablet can be swallowed whole, or you may break the tablet in half along the score line  Do not break the tablet unless your doctor tells you to  Do not crush or chew the tablet  · This medicine should come with a Medication Guide  Ask your pharmacist for a copy if you do not have one  · Missed dose: Take a dose as soon as you remember  If it is almost time for your next dose, wait until then and take a regular dose  Do not take extra medicine to make up for a missed dose  · Store the medicine in a closed container at room temperature, away from heat, moisture, and direct light  Drugs and Foods to Avoid:   Ask your doctor or pharmacist before using any other medicine, including over-the-counter medicines, vitamins, and herbal products  · Do not use trazodone if you currently take an MAO inhibitor (MAOI) or have used an MAOI in the past 14 days  · Tell your doctor if you also use any of the following:  ¨ Carbamazepine, digoxin, phenytoin, indinavir, ritonavir, buspirone, fentanyl, lithium, tryptophan, Ander's wort, tramadol  ¨ Medicine to treat a fungal infection (such as itraconazole, ketoconazole), a diuretic (water pill), blood pressure medicine, an NSAID pain or arthritis medicine (such as aspirin, celecoxib, diclofenac, ibuprofen, naproxen), a blood thinner (such as warfarin), other medicine for depression, or triptan medicine to treat migraine headaches  · Do not drink alcohol while you are using this medicine  · Tell your doctor if you use anything else that makes you sleepy  Some examples are allergy medicine, narcotic pain medicine, and alcohol  Warnings While Using This Medicine:   · Tell your doctor if you are pregnant or breastfeeding, or if you have kidney disease, liver disease, bleeding problems, glaucoma, heart disease, heart rhythm problems, or low blood pressure  Tell your doctor if you recently had a heart attack  · For some children, teenagers, and young adults, this medicine may increase mental or emotional problems   This may lead to thoughts of suicide and violence  Talk with your doctor right away if you have any thoughts or behavior changes that concern you  Tell your doctor if you or anyone in your family has a history of bipolar disorder or suicide attempts  · This medicine may cause the following problems:  ¨ Serotonin syndrome (more likely when used with certain other medicines)  ¨ Heart rhythm problems (QT prolongation)  ¨ Low sodium levels  ¨ Higher risk of bleeding  · Do not stop using this medicine suddenly  Your doctor will need to slowly decrease your dose before you stop it completely  · This medicine may make you dizzy or drowsy  Do not drive or do anything that could be dangerous until you know how this medicine affects you  Stand or sit up slowly if you are dizzy  · Tell any doctor or dentist who treats you that you are using this medicine  You may need to stop using this medicine several days before you have surgery or medical tests  · Your doctor will check your progress and the effects of this medicine at regular visits  Keep all appointments  · Keep all medicine out of the reach of children  Never share your medicine with anyone    Possible Side Effects While Using This Medicine:   Call your doctor right away if you notice any of these side effects:  · Allergic reaction: Itching or hives, swelling in your face or hands, swelling or tingling in your mouth or throat, chest tightness, trouble breathing  · Anxiety, restlessness, fever, sweating, muscle spasms, nausea, vomiting, diarrhea, seeing or hearing things that are not there  · Confusion, weakness, muscle twitching  · Fast, pounding, or uneven heartbeat  · Lightheadedness, dizziness, fainting  · Painful, prolonged erection of your penis  · Sudden increase in energy, feeling irritable, trouble sleeping  · Thoughts of hurting yourself or others, unusual behavior  · Unusual bleeding or bruising  If you notice these less serious side effects, talk with your doctor: · Constipation, mild nausea  · Dry mouth  · Eye pain, vision changes, seeing halos around lights  · Headache  · Sleepiness or unusual drowsiness  If you notice other side effects that you think are caused by this medicine, tell your doctor  Call your doctor for medical advice about side effects  You may report side effects to FDA at 8-724-FDA-0257  © 2017 [address] Information is for End User's use only and may not be sold, redistributed or otherwise used for commercial purposes  The above information is an  only  It is not intended as medical advice for individual conditions or treatments  Talk to your doctor, nurse or pharmacist before following any medical regimen to see if it is safe and effective for you  Hypokalemia   WHAT YOU NEED TO KNOW:   Hypokalemia is a low level of potassium in your blood  Potassium helps control how your muscles, heart, and digestive system work  Hypokalemia occurs when your body loses too much potassium or does not absorb enough from food  DISCHARGE INSTRUCTIONS:   Seek care immediately if:   · You cannot move your arm or leg  · You have a fast or irregular heartbeat  · You are too tired or weak to stand up  Contact your healthcare provider if:   · You are vomiting, or you have diarrhea  · You have numbness or tingling in your arms or legs  · Your symptoms do not go away or they get worse  · You have questions or concerns about your condition or care  Medicines:   · Potassium  will be given to bring your potassium levels back to normal     · Take your medicine as directed  Contact your healthcare provider if you think your medicine is not helping or if you have side effects  Tell him of her if you are allergic to any medicine  Keep a list of the medicines, vitamins, and herbs you take  Include the amounts, and when and why you take them  Bring the list or the pill bottles to follow-up visits   Carry your medicine list with you in case of an emergency  Eat foods that are high in potassium:  Foods that are high in potassium include bananas, oranges, tomatoes, potatoes, and avocado  Marley beans, turkey, salmon, lean beef, yogurt, and milk are also high in potassium  Ask your healthcare provider or dietitian for more information about foods that are high in potassium  Follow up with your healthcare provider as directed:  Write down your questions so you remember to ask them during your visits  © 2017 [address] [facility] Information is for End User's use only and may not be sold, redistributed or otherwise used for commercial purposes  All illustrations and images included in CareNotes® are the copyrighted property of [employer] , [employer]  or Rj Hardy  The above information is an  only  It is not intended as medical advice for individual conditions or treatments  Talk to your doctor, nurse or pharmacist before following any medical regimen to see if it is safe and effective for you

## 2018-03-12 NOTE — PLAN OF CARE
Alteration in Thoughts and Perception     Treatment Goal: Gain control of psychotic behaviors/thinking, reduce/eliminate presenting symptoms and demonstrate improved reality functioning upon discharge Adequate for Discharge     Recognize dysfunctional thoughts, communicate reality-based thoughts at the time of discharge Adequate for Discharge        ANXIETY     Will report anxiety at manageable levels Adequate for Discharge     By discharge: Patient will verbalize 2 strategies to deal with anxiety Adequate for Discharge        DEPRESSION     Will be euthymic at discharge Adequate for Discharge        Jessica Marquez Discharge to home or other facility with appropriate resources Adequate for Discharge        Ineffective Coping     Participates in unit activities Adequate for Discharge        Ineffective Coping     Participates in unit activities Adequate for Discharge        Prexisting or High Potential for Compromised Skin Integrity     Skin integrity is maintained or improved Adequate for Discharge        PSYCHOSIS     Will report no hallucinations or delusions Adequate for Discharge        SELF HARM/SUICIDALITY     Will have no self-injury during hospital stay Adequate for Discharge

## 2018-03-12 NOTE — DISCHARGE INSTR - LAB
Contact Information: If you have any questions, concerns, pended studies, tests and/or procedures, or emergencies regarding your inpatient behavioral health visit  Please contact [facility] [phone] and ask to speak to a , nurse or physician  A contact is available 24 hours/ 7 days a week at this number  Summary of Procedures Performed During your Stay:  Below is a list of major procedures performed during your hospital stay and a summary of results:  - No major procedures performed  Pending Studies     None        If studies are pending at discharge, follow up with your PCP and/or referring provider  Contact Information: If you have any questions, concerns, pended studies, tests and/or procedures, or emergencies regarding your inpatient behavioral health visit  Please contact [facility] [phone] and ask to speak to a , nurse or physician  A contact is available 24 hours/ 7 days a week at this number  Summary of Procedures Performed During your Stay:  Below is a list of major procedures performed during your hospital stay and a summary of results:  - No major procedures performed  Pending Studies     None        If studies are pending at discharge, follow up with your PCP and/or referring provider

## 2018-03-12 NOTE — PROGRESS NOTES
Pt visible on unit all evening  Reported having a good day, had bright affect  Attended group and interacted  Medication compliant

## 2018-03-12 NOTE — PROGRESS NOTES
Pt is med and meal compliant  Pt is visual in the milieu  Pt socializes with selected peers  Pt appears calm  Continue to monitor

## 2018-03-12 NOTE — NURSING NOTE
Discharge instructions explained to pt and pt's sister Feng Call  Scripts given to pt  Pt stated loudly 'you might as well just get rid of these  What good are they ' Pt's sister came up to nurses station and states 'I don't understand why I am taking my sister home  She just told me she is feeling suicidal  I don't feel she is any better than when I brought her in here  She is still saying exactly the same stuff as she was ' Jevon Mcintosh PA-C made aware and currently is seeing pt

## 2018-03-12 NOTE — DISCHARGE SUMMARY
Discharge Summary - [facility] 65 y o  female MRN: [id number]  Unit/Bed#: ET5 575-01 Encounter: [id number]        Admission Date: 3/4/2018         Discharge Date: No discharge date for patient encounter  Attending Psychiatrist: Rosamaria Rodriguez MD    Reason for Admission/HPI:   History of Present Illness     Patrica Wade is a 65 y o  female with h/o Schizoaffective Disorder and anxiety with a prior psychiatric admission, and hypothyroidism  She was brought to the [place] [address] ER on 3/6/2018 by her sister and brother for Sxs of paranoid delusions and command auditory hallucinations but not exactly to harm herself  (Though her sister later informed hospital that Pt has had command hallucinations to commit suicide and had once overdosed in the past )  She stated the voices were hurting her body and would play with the locks and electrical systems in her home  She also admitted to having stopped her psychiatric medications (Risperidone 0 5mg bid, Sertraline 100mg qd and Trazodone 100mg qhs) because she felt they were not helping  In the ER, a TSH was elevated at 4 09,  Potassium level was 2 9, a UDS and ethanol test were negative  After medical clearance on 3/5/2018 the Patient was admitted to [facility] Adult [address] [place] [address] Unit on [place] [address] on a voluntarily 201 commitment basis  Hospital Course: On the unit Initially on the unit, she was paranoid, disturbed by the hallucinations and required a Haldol prn dose to help calm down  She was seen by the psychiatrist Rosamaria Rodriguez MD whose initial HPI is as follows:      " Hong Adarsh Complaint:    Hearing voices and thoughts of overdosing     History of present illness:  Patient is a 65years old  female admitted on a 201 due to above symptoms which have been present for a few months    She reports that she hears voices of 6 individuals all named Olman Dixon who tell her to do or not to do certain things such as keeping her clothes on when she goes to bed or not showering  These are scary and she believes that they belong to real individuals and they follow her wherever she goes including here at the hospital   She has some vegetative signs of depression such as insomnia and poor appetite and weight loss of 10 lb over the past few weeks  Although she is upset about this whole situation she does not report crying spells  In January of this year, she was so upset that she took an overdose but did not seek medical help she is not clear what she overdosed on  She is reportedly under care of a psychiatrist but she does not remember the name  Psychiatric Review Of Systems:  Change in sleep: yes  appetite changes: yes  weight changes: yes  Change in energy/anergy: no  Change in interest/pleasure/anhedonia: no  somatic symptoms: no  anxiety/panic: no  juan: no  guilty/hopeless: no  self injurious behavior/risky behavior: yes ]"    Risperidone was increased for psychotic Sxs, and titrated to 3mg total per day, but was ineffective  This atypical antipsychotic medication was later switched to Asenapine for better mood and psychotic Sxs coverage, and dose was titrated to 10mg bid  Trazodone and Sertraline were continued unchanged  The patient was visible in the milieu but guarded, and did not socialize much or participate in group activites (though she listened)  She was cooperative and pleasant for the duration of her stay  However there was a pessimism of thought regarding her medications which caused her to initially refuse them  Pt denied SI or HI or intent and for the most part, denied that the voices commanded her to harm herself or others  With time and medication adjustment, the voices became a little less frequent and quieter  Potassium supplementation was given--titrated to 40mEq daily, and level was normal (4 3) before time of discharge    Her affect became a bit more bright and she became more cooperative and compliant with medications  The voices appeared to be brought to a baseline level, and she presently denies any SI or HI on this day of discharge  Pt was deemed to have reached maximum benefit inpatient and is safe to go home with quick f/u  --she will see her psychiatrist Dr Scarlet Casillas today at [place] [address] Status Evaluation:  Appearance:  Dressed, Fair hygiene, Good eye contact today   Behavior:  Calm, cooperative, pleasant   Speech:  Clear, normal rate and volume   Mood:  Less anxious   Affect:  A bit brighter today--smiled a few times  Thought Process:  Organized   Associations: Intact associations   Thought Content:  No verbalizated delusion, but some pessimistic thinking   Perceptual Disturbances: AH are ongoing, but improving as per HPI    No other hallucinations reported and Pt does not appear to be responding to internal stimuli   Risk Potential: Pt presently denies SI or HI    Sensorium:  Place, Day of the week, Month, Year, and person   Memory:  short term memory grossly intact   Consciousness:  alert, awake   Attention: Fair   Insight:  Limited   Judgment: Improving   Gait/Station: Normal gait/station   Motor Activity: No abnormal movements     Vitals:    03/12/18 0651   BP: 112/55   Pulse: 81   Resp: 18   Temp: 98 9 °F (37 2 °C)   SpO2: 95%       Admission on 03/04/2018   Component Date Value    WBC 03/04/2018 6 47     RBC 03/04/2018 4 84     Hemoglobin 03/04/2018 15 2     Hematocrit 03/04/2018 43 2     MCV 03/04/2018 89     MCH 03/04/2018 31 4     MCHC 03/04/2018 35 2     RDW 03/04/2018 12 7     MPV 03/04/2018 11 4     Platelets 86/21/9527 290     nRBC 03/04/2018 0     Neutrophils Relative 03/04/2018 58     Lymphocytes Relative 03/04/2018 30     Monocytes Relative 03/04/2018 10     Eosinophils Relative 03/04/2018 1     Basophils Relative 03/04/2018 1     Neutrophils Absolute 03/04/2018 3 70     Lymphocytes Absolute 03/04/2018 1 92     Monocytes Absolute 03/04/2018 0 67     Eosinophils Absolute 03/04/2018 0 09     Basophils Absolute 03/04/2018 0 08     Sodium 03/04/2018 140     Potassium 03/04/2018 2 9*    Chloride 03/04/2018 102     CO2 03/04/2018 32     Anion Gap 03/04/2018 6     BUN 03/04/2018 5     Creatinine 03/04/2018 0 75     Glucose 03/04/2018 111     Calcium 03/04/2018 8 8     AST 03/04/2018 14     ALT 03/04/2018 15     Alkaline Phosphatase 03/04/2018 60     Total Protein 03/04/2018 7 4     Albumin 03/04/2018 3 6     Total Bilirubin 03/04/2018 0 63     eGFR 03/04/2018 86     TSH 3RD GENERATON 03/04/2018 4 090*    Color, UA 03/04/2018 Yellow     Clarity, UA 03/04/2018 Clear     Specific Gravity, UA 03/04/2018 1 003     pH, UA 03/04/2018 6 0     Leukocytes, UA 03/04/2018 Small*    Nitrite, UA 03/04/2018 Negative     Protein, UA 03/04/2018 Negative     Glucose, UA 03/04/2018 Negative     Ketones, UA 03/04/2018 Negative     Urobilinogen, UA 03/04/2018 1 0     Bilirubin, UA 03/04/2018 Negative     Blood, UA 03/04/2018 Negative     Amph/Meth UR 03/04/2018 Negative     Barbiturate Ur 03/04/2018 Negative     Benzodiazepine Urine 03/04/2018 Negative     Cocaine Urine 03/04/2018 Negative     Methadone Urine 03/04/2018 Negative     Opiate Urine 03/04/2018 Negative     PCP Ur 03/04/2018 Negative     THC Urine 03/04/2018 Negative     Ethanol Lvl 03/04/2018 <3     Color, UA 03/04/2018 Yellow     Clarity, UA 03/04/2018 Clear     pH, UA 03/04/2018 6 0     Leukocytes, UA 03/04/2018 Small*    Nitrite, UA 03/04/2018 Negative     Protein, UA 03/04/2018 Negative     Glucose, UA 03/04/2018 Negative     Ketones, UA 03/04/2018 Negative     Urobilinogen, UA 03/04/2018 0 2     Bilirubin, UA 03/04/2018 Negative     Blood, UA 03/04/2018 Negative     Specific Gravity, UA 03/04/2018 <=1 005     RBC, UA 03/04/2018 None Seen     WBC, UA 03/04/2018 2-4*    Epithelial Cells 03/04/2018 None Seen     Bacteria, UA 03/04/2018 None Seen     Ventricular Rate 03/04/2018 92  Atrial Rate 03/04/2018 92     WI Interval 03/04/2018 182     QRSD Interval 03/04/2018 80     QT Interval 03/04/2018 352     QTC Interval 03/04/2018 435     P Axis 03/04/2018 74     QRS Axis 03/04/2018 6     T Wave Axis 03/04/2018 64     Cholesterol 03/05/2018 204*    Triglycerides 03/05/2018 111     HDL, Direct 03/05/2018 49     LDL Calculated 03/05/2018 133*    Sodium 03/05/2018 140     Potassium 03/05/2018 3 0*    Chloride 03/05/2018 105     CO2 03/05/2018 29     Anion Gap 03/05/2018 6     BUN 03/05/2018 8     Creatinine 03/05/2018 0 62     Glucose 03/05/2018 108     Calcium 03/05/2018 8 5     AST 03/05/2018 14     ALT 03/05/2018 15     Alkaline Phosphatase 03/05/2018 58     Total Protein 03/05/2018 7 0     Albumin 03/05/2018 3 4*    Total Bilirubin 03/05/2018 0 72     eGFR 03/05/2018 98     RPR 03/05/2018 Non-Reactive     Sodium 03/07/2018 142     Potassium 03/07/2018 3 1*    Chloride 03/07/2018 105     CO2 03/07/2018 31     Anion Gap 03/07/2018 6     BUN 03/07/2018 9     Creatinine 03/07/2018 0 65     Glucose 03/07/2018 107     Calcium 03/07/2018 8 4     eGFR 03/07/2018 96     Ventricular Rate 03/07/2018 69     Atrial Rate 03/07/2018 69     WI Interval 03/07/2018 166     QRSD Interval 03/07/2018 76     QT Interval 03/07/2018 368     QTC Interval 03/07/2018 394     P Axis 03/07/2018 71     QRS Axis 03/07/2018 13     T Wave Axis 03/07/2018 58     Sodium 03/08/2018 140     Potassium 03/08/2018 3 9     Chloride 03/08/2018 105     CO2 03/08/2018 31     Anion Gap 03/08/2018 4     BUN 03/08/2018 9     Creatinine 03/08/2018 0 57*    Glucose 03/08/2018 95     Calcium 03/08/2018 8 6     AST 03/08/2018 11     ALT 03/08/2018 11*    Alkaline Phosphatase 03/08/2018 50     Total Protein 03/08/2018 5 9*    Albumin 03/08/2018 3 0*    Total Bilirubin 03/08/2018 0 43     eGFR 03/08/2018 100     Sodium 03/10/2018 142     Potassium 03/10/2018 4 3     Chloride 03/10/2018 109*    CO2 03/10/2018 29     Anion Gap 03/10/2018 4     BUN 03/10/2018 9     Creatinine 03/10/2018 0 56*    Glucose 03/10/2018 96     Calcium 03/10/2018 8 3     eGFR 03/10/2018 101        Discharge Diagnosis:   Principal Problem:    Schizoaffective disorder, depressive type ([facility] [address] )  Resolved Problems:    * No resolved hospital problems  *      Discharge Medications:  See after visit summary for reconciled discharge medications provided to patient and family  Discharge instructions/Information to patient and family:   See after visit summary for information provided to patient and family  Provisions for Follow-Up Care:  See after visit summary for information related to follow-up care and any pertinent home health orders  Discharge Statement   I spent 45 minutes discharging the patient  This time was spent on the day of discharge  I had direct contact with the patient on the day of discharge  I discussed plan and treatment with Pt  Risks, benefits, and possible side effects of medications explained to patient and patient verbalizes understanding

## 2018-03-12 NOTE — PROGRESS NOTES
Pt bright upon approach and happy to be staying another night  Pt smiling and laughing with peers and sister currently  Will continue to monitor

## 2018-03-12 NOTE — PROGRESS NOTES
Progress Note - Behavioral Health     Hali Pandya 65 y o  female MRN: [id number]  Unit/Bed#: QF0 575-01 Encounter: [id number]    Hali Pandya was seen for continuing care and reviewed with treatment team   Pt was originally planned for discharge today and had reported a reduction in psychotic Sxs  However, when Pt's sister Willow Varma arrived to  Pt, she elicited more information which brought about a change of plan  Per Willow Varma, Pt did not seem better to her and she stated that Pt was not being truthful about the extent of her symptoms  Moreover, Pt made a statement that she wanted to die because of the voices  I interviewed Pt with her sister and Pt stated that her voices sometimes do get quieter but are always there and in general, have gotten "Meaner" over the past several months to perhaps over a year  Pt expressed hopelessness and began to yell that "Nothing is gonna work" in regards to her medications  When I asked if she would harm herself if at home, she did not express a plan but wished she was dead because of the voices  Pt started to talk about how she is alonoe at home and has no cable to watch the TV (which can mitigate the voices,)  but that also the voices interfere with her cable  Pt was visibly upset and could not be safely discharged  She will remain on [address] for continued treatment        Sleep: Good   Appetite: Good  Medication side effects: None  ROS:  No complaints per Pt    Labs/Tests:    Mental Status Evaluation:  Appearance:  Dressed, Fair hygiene, Good eye contact   Behavior:  Restless, but cooperative, visibly upset    Speech:  Clear, normal rate, now --started to yell loudly a few times   Mood:  Anxious, Depressed, Agitated   Affect:  Constricted   Thought Process:  Organized   Associations: Intact associations   Thought Content:  Hopeless, more pessimistic thinking   Perceptual Disturbances: Pt is having disturbing AH but no other type of hallucinations and does not appear to be responding to internal stimuli   Risk Potential: Yes--having death wishes but no plan of suicide  Uncertain if firm intent for suicide if she was not inpatient, though sister believes strongly that she may be driven to this by the voices  if she is home alone   Sensorium:  Place, Day of the week, Month, Year, and person   Memory:  short term memory grossly intact   Consciousness:  alert, awake   Attention: Fair   Insight:  Limited   Judgment: Improving   Gait/Station: Normal gait/station   Motor Activity: No abnormal movements     Vitals:    03/12/18 1618   BP: 121/63   Pulse: 76   Resp: 18   Temp: 97 8 °F (36 6 °C)   SpO2: 97%       Admission on 03/04/2018   Component Date Value    WBC 03/04/2018 6 47     RBC 03/04/2018 4 84     Hemoglobin 03/04/2018 15 2     Hematocrit 03/04/2018 43 2     MCV 03/04/2018 89     MCH 03/04/2018 31 4     MCHC 03/04/2018 35 2     RDW 03/04/2018 12 7     MPV 03/04/2018 11 4     Platelets 82/54/2188 290     nRBC 03/04/2018 0     Neutrophils Relative 03/04/2018 58     Lymphocytes Relative 03/04/2018 30     Monocytes Relative 03/04/2018 10     Eosinophils Relative 03/04/2018 1     Basophils Relative 03/04/2018 1     Neutrophils Absolute 03/04/2018 3 70     Lymphocytes Absolute 03/04/2018 1 92     Monocytes Absolute 03/04/2018 0 67     Eosinophils Absolute 03/04/2018 0 09     Basophils Absolute 03/04/2018 0 08     Sodium 03/04/2018 140     Potassium 03/04/2018 2 9*    Chloride 03/04/2018 102     CO2 03/04/2018 32     Anion Gap 03/04/2018 6     BUN 03/04/2018 5     Creatinine 03/04/2018 0 75     Glucose 03/04/2018 111     Calcium 03/04/2018 8 8     AST 03/04/2018 14     ALT 03/04/2018 15     Alkaline Phosphatase 03/04/2018 60     Total Protein 03/04/2018 7 4     Albumin 03/04/2018 3 6     Total Bilirubin 03/04/2018 0 63     eGFR 03/04/2018 86     TSH 3RD GENERATON 03/04/2018 4 090*    Color, UA 03/04/2018 Yellow     Clarity, UA 03/04/2018 Clear  Specific Gravity, UA 03/04/2018 1 003     pH, UA 03/04/2018 6 0     Leukocytes, UA 03/04/2018 Small*    Nitrite, UA 03/04/2018 Negative     Protein, UA 03/04/2018 Negative     Glucose, UA 03/04/2018 Negative     Ketones, UA 03/04/2018 Negative     Urobilinogen, UA 03/04/2018 1 0     Bilirubin, UA 03/04/2018 Negative     Blood, UA 03/04/2018 Negative     Amph/Meth UR 03/04/2018 Negative     Barbiturate Ur 03/04/2018 Negative     Benzodiazepine Urine 03/04/2018 Negative     Cocaine Urine 03/04/2018 Negative     Methadone Urine 03/04/2018 Negative     Opiate Urine 03/04/2018 Negative     PCP Ur 03/04/2018 Negative     THC Urine 03/04/2018 Negative     Ethanol Lvl 03/04/2018 <3     Color, UA 03/04/2018 Yellow     Clarity, UA 03/04/2018 Clear     pH, UA 03/04/2018 6 0     Leukocytes, UA 03/04/2018 Small*    Nitrite, UA 03/04/2018 Negative     Protein, UA 03/04/2018 Negative     Glucose, UA 03/04/2018 Negative     Ketones, UA 03/04/2018 Negative     Urobilinogen, UA 03/04/2018 0 2     Bilirubin, UA 03/04/2018 Negative     Blood, UA 03/04/2018 Negative     Specific Gravity, UA 03/04/2018 <=1 005     RBC, UA 03/04/2018 None Seen     WBC, UA 03/04/2018 2-4*    Epithelial Cells 03/04/2018 None Seen     Bacteria, UA 03/04/2018 None Seen     Ventricular Rate 03/04/2018 92     Atrial Rate 03/04/2018 92     IA Interval 03/04/2018 182     QRSD Interval 03/04/2018 80     QT Interval 03/04/2018 352     QTC Interval 03/04/2018 435     P Axis 03/04/2018 74     QRS Axis 03/04/2018 6     T Wave Axis 03/04/2018 64     Cholesterol 03/05/2018 204*    Triglycerides 03/05/2018 111     HDL, Direct 03/05/2018 49     LDL Calculated 03/05/2018 133*    Sodium 03/05/2018 140     Potassium 03/05/2018 3 0*    Chloride 03/05/2018 105     CO2 03/05/2018 29     Anion Gap 03/05/2018 6     BUN 03/05/2018 8     Creatinine 03/05/2018 0 62     Glucose 03/05/2018 108     Calcium 03/05/2018 8 5     AST 03/05/2018 14     ALT 03/05/2018 15     Alkaline Phosphatase 03/05/2018 58     Total Protein 03/05/2018 7 0     Albumin 03/05/2018 3 4*    Total Bilirubin 03/05/2018 0 72     eGFR 03/05/2018 98     RPR 03/05/2018 Non-Reactive     Sodium 03/07/2018 142     Potassium 03/07/2018 3 1*    Chloride 03/07/2018 105     CO2 03/07/2018 31     Anion Gap 03/07/2018 6     BUN 03/07/2018 9     Creatinine 03/07/2018 0 65     Glucose 03/07/2018 107     Calcium 03/07/2018 8 4     eGFR 03/07/2018 96     Ventricular Rate 03/07/2018 69     Atrial Rate 03/07/2018 69     VA Interval 03/07/2018 166     QRSD Interval 03/07/2018 76     QT Interval 03/07/2018 368     QTC Interval 03/07/2018 394     P Axis 03/07/2018 71     QRS Axis 03/07/2018 13     T Wave Axis 03/07/2018 58     Sodium 03/08/2018 140     Potassium 03/08/2018 3 9     Chloride 03/08/2018 105     CO2 03/08/2018 31     Anion Gap 03/08/2018 4     BUN 03/08/2018 9     Creatinine 03/08/2018 0 57*    Glucose 03/08/2018 95     Calcium 03/08/2018 8 6     AST 03/08/2018 11     ALT 03/08/2018 11*    Alkaline Phosphatase 03/08/2018 50     Total Protein 03/08/2018 5 9*    Albumin 03/08/2018 3 0*    Total Bilirubin 03/08/2018 0 43     eGFR 03/08/2018 100     Sodium 03/10/2018 142     Potassium 03/10/2018 4 3     Chloride 03/10/2018 109*    CO2 03/10/2018 29     Anion Gap 03/10/2018 4     BUN 03/10/2018 9     Creatinine 03/10/2018 0 56*    Glucose 03/10/2018 96     Calcium 03/10/2018 8 3     eGFR 03/10/2018 101        Progress Toward Goals:   Continue present medications  Add a standing dose of Haloperidol 2mg qhs for psychotic Sxs  Assessment/Plan   Principal Problem:    Schizoaffective disorder, depressive type ([facility] [address] )      Recommended Treatment: Continue with pharmacotherapy, group therapy, milieu therapy and occupational therapy    The patient will be maintained on the following medications:    Current Facility-Administered Medications:  aluminum-magnesium hydroxide-simethicone 30 mL Oral Q4H PRN Gena Banda MD   asenapine 10 mg Sublingual BID Mychal Malcolm MD   haloperidol 2 mg Oral HS Mary Lou Ervin PA-C   haloperidol 5 mg Oral Q8H PRN Gena Banda MD   haloperidol lactate 5 mg Intramuscular Q6H PRN Gena Banda MD   ibuprofen 400 mg Oral Q6H PRN Sena Dixon PA-C   ibuprofen 600 mg Oral Q8H PRN Gena Banda MD   ibuprofen 800 mg Oral Q8H PRN Gena Banda MD   levothyroxine 50 mcg Oral Early Morning Mychal Malcolm MD   LORazepam 1 mg Oral Q8H PRN Gena Banda MD   magnesium hydroxide 30 mL Oral Daily PRN Gena Banda MD   potassium chloride 40 mEq Oral Daily Gena Banda MD   sertraline 100 mg Oral Daily Mychal Malcolm MD   traZODone 100 mg Oral HS Mychal Malcolm MD   traZODone 50 mg Oral HS PRN Gena Banda MD       Risks, benefits and possible side effects of Medications:   Risks, benefits, and possible side effects of medications explained to patient and patient verbalizes understanding

## 2018-03-12 NOTE — PROGRESS NOTES
Pt alert, calm, pleasant and cooperative, bright on approach, denies SI, HI, anxiety and depression, continues to report AH, when ask what are the voices saying? Pt responded, " They keep saying they can't wait till I get home to harass me" otherwise she is feeling "really good" today  Pt taking all medications as ordered, visible and social with peers, attending and participating in all groups, will continue to monitor

## 2018-03-12 NOTE — CMS CERTIFICATION NOTE
Certification: Based upon physical, mental and social evaluations, I certify that inpatient psychiatric services are medically necessary for this patient for a duration of 3 midnights for the treatment of psychosis    Available alternative community resources do not meet the patient's mental health care needs  I further attest that an established written individualized plan of care has been implemented and is outlined in the patient's medical records

## 2018-03-12 NOTE — PROGRESS NOTES
1 purple jacket, 1 pair glasses, 2 rings, magazines, 1 pair sneakers, 1 coat  Reports pants, shirt, and a bra missing but states not to worry about it because they looked everywhere

## 2018-03-13 PROCEDURE — 99231 SBSQ HOSP IP/OBS SF/LOW 25: CPT | Performed by: PSYCHIATRY & NEUROLOGY

## 2018-03-13 RX ORDER — SERTRALINE HYDROCHLORIDE 100 MG/1
TABLET, FILM COATED ORAL
Status: DISPENSED
Start: 2018-03-13 | End: 2018-03-13

## 2018-03-13 RX ORDER — LEVOTHYROXINE SODIUM 0.05 MG/1
TABLET ORAL
Status: DISPENSED
Start: 2018-03-13 | End: 2018-03-13

## 2018-03-13 RX ORDER — POTASSIUM CHLORIDE 20 MEQ/1
TABLET, EXTENDED RELEASE ORAL
Status: DISPENSED
Start: 2018-03-13 | End: 2018-03-13

## 2018-03-13 RX ADMIN — HALOPERIDOL 2 MG: 2 TABLET ORAL at 21:08

## 2018-03-13 RX ADMIN — LEVOTHYROXINE SODIUM 50 MCG: 50 TABLET ORAL at 06:17

## 2018-03-13 RX ADMIN — SERTRALINE HYDROCHLORIDE 100 MG: 100 TABLET ORAL at 09:23

## 2018-03-13 RX ADMIN — TRAZODONE HYDROCHLORIDE 100 MG: 100 TABLET ORAL at 21:08

## 2018-03-13 RX ADMIN — POTASSIUM CHLORIDE 40 MEQ: 1500 TABLET, EXTENDED RELEASE ORAL at 09:23

## 2018-03-13 RX ADMIN — ASENAPINE MALEATE 10 MG: 10 TABLET SUBLINGUAL at 17:21

## 2018-03-13 RX ADMIN — ASENAPINE MALEATE 10 MG: 10 TABLET SUBLINGUAL at 09:23

## 2018-03-13 NOTE — PLAN OF CARE
Problem: Ineffective Coping  Goal: Participates in unit activities  Interventions:  - Provide therapeutic environment   - Provide required programming   - Redirect inappropriate behaviors    Outcome: Progressing  Pt socially appropriate in groups and displays bright affect  She was unable to articulate reasons that she felt the need to remain in the hospital Pt was able to state awareness that the hospital is not a long term living arrangement

## 2018-03-13 NOTE — CASE MANAGEMENT
Met with pt to hear what happened that she was not discharged as planned on 3/12  She stated that though she hears the voices but she feels safe here and felt she would kill herself if we made her return to her apartment  Pt is agreeable to consider assisted care facility placement  She is aware she would need to pay her SSI in it's entirety and get approx  $80 mo stipend  She has signed an SKY for me to contact and send clinical to [facility] of [facility]   I have contacted [facility] and they may have one female opening soon  It would be a second floor, shared room and Bo Albert is agreeable to be considered for same  She has called her sister and reports that Ibis Peters is supportive of this pursuit  I have faxed the clinical to Daisy Cantrell at [facility], [phone],  and await her response

## 2018-03-13 NOTE — PROGRESS NOTES
Progress Note - [address] [place] 65 y o  female MRN: [id number]  Unit/Bed#: OV7 575-01 Encounter: [id number]    The patient was seen for continuing care and reviewed with treatment team   The patient expressed suicidal ideation and told her sister that she could not tolerate hearing these voices anymore and she was afraid that she was going to kill herself after she goes home  She does not want to live alone anymore  Mental Status Evaluation:  Appearance:  Adequate hygiene and grooming and Good eye contact   Behavior:  calm and cooperative   Fund of knowledge  Not assessed   Speech:   Language: Normal rate and Normal volume  No overt abnormality   Mood:  anxious   Affect:   Associations: appropriate  Tightly connected   Thought Process:  Goal directed and coherent   Thought Content:  Paranoid and mistrustful and Delusions of persecution   Perceptual Disturbances: Auditory hallucinations without commands   Risk Potential: Suicidal Ideations without plan   Orientation  Oriented x 3   Memory grossly intact   Attention/Concentration attention span and concentration were age appropriate   Insight:  limited   Judgment: Limited   Gait/Station: normal gait/station and normal balance   Motor Activity: No abnormal movement noted     Progress Toward Goals:  No changes-    Assessment/Plan    Principal Problem:    Schizoaffective disorder, depressive type ([address] )      Recommended Treatment:  The option of assisted living will be discussed with the family Continue with pharmacotherapy, group therapy, milieu therapy and occupational therapy    The patient will be maintained on the following medications:    Current Facility-Administered Medications:  aluminum-magnesium hydroxide-simethicone 30 mL Oral Q4H PRN Umang Bello MD   asenapine 10 mg Sublingual BID Viktor Louis MD   haloperidol 2 mg Oral HS Mary Lou Ervin PA-C   haloperidol 5 mg Oral Q8H PRN Umang Bello MD   haloperidol lactate 5 mg Intramuscular Q6H PRN Taylor Patterson MD   ibuprofen 400 mg Oral Q6H PRN Jennie Pleitez PA-C   ibuprofen 600 mg Oral Q8H PRN Taylor Patterson MD   ibuprofen 800 mg Oral Q8H PRN Taylor Patterson MD   levothyroxine 50 mcg Oral Early Morning Emma Varner MD   LORazepam 1 mg Oral Q8H PRN Taylor Patterson MD   magnesium hydroxide 30 mL Oral Daily PRN Taylor Patterson MD   potassium chloride 40 mEq Oral Daily Taylor Patterson MD   sertraline 100 mg Oral Daily Emma Varner MD   traZODone 100 mg Oral HS Emma Varner MD   traZODone 50 mg Oral HS PRN Taylor Patterson MD       Risks, benefits and possible side effects of Medications:   Risks, benefits, and possible side effects of medications explained to patient and patient verbalizes understanding

## 2018-03-13 NOTE — CASE MANAGEMENT
Since pt was not discharged as planned ion 3/12, I have called [facility] today to let them know that the pt is still here as an inpt  I expressed apologies to Dr Megan Obrien as pt was set to see him on 3/12 at 5:00PM      In Tx team meeting this AM we discussed the recommendation that pt live in assisted care setting  I will approach this topic with pt and her sister Ibis Hopsoni

## 2018-03-13 NOTE — PROGRESS NOTES
Pt calm, cooperative, pleasant  Visible on unit for breakfast and group  Pt complaint with medications and tolerated treatment well  Pt Denies HI, pain, anxiety, depression  States that she "wishes to be dead so I don't have to hear the voices anymore " When asked what the voices are saying, she stated "a lot of things"  Nurse asked what kind of things the voices are saying and she said "i'd rather not say " Pt verbally contracts for safety on the unit  Will continue to monitor

## 2018-03-13 NOTE — PLAN OF CARE

## 2018-03-14 PROCEDURE — 99232 SBSQ HOSP IP/OBS MODERATE 35: CPT | Performed by: PSYCHIATRY & NEUROLOGY

## 2018-03-14 RX ORDER — HALOPERIDOL 5 MG
5 TABLET ORAL
Status: DISCONTINUED | OUTPATIENT
Start: 2018-03-14 | End: 2018-03-19 | Stop reason: HOSPADM

## 2018-03-14 RX ADMIN — ASENAPINE MALEATE 10 MG: 10 TABLET SUBLINGUAL at 08:29

## 2018-03-14 RX ADMIN — HALOPERIDOL 5 MG: 5 TABLET ORAL at 21:47

## 2018-03-14 RX ADMIN — TRAZODONE HYDROCHLORIDE 100 MG: 100 TABLET ORAL at 21:47

## 2018-03-14 RX ADMIN — LEVOTHYROXINE SODIUM 50 MCG: 50 TABLET ORAL at 06:25

## 2018-03-14 RX ADMIN — POTASSIUM CHLORIDE 40 MEQ: 1500 TABLET, EXTENDED RELEASE ORAL at 08:29

## 2018-03-14 RX ADMIN — SERTRALINE HYDROCHLORIDE 100 MG: 100 TABLET ORAL at 08:29

## 2018-03-14 RX ADMIN — ASENAPINE MALEATE 10 MG: 10 TABLET SUBLINGUAL at 17:10

## 2018-03-14 NOTE — PROGRESS NOTES
Progress Note - Behavioral Health   Jarad Sullivan 65 y o  female MRN: [id number]  Unit/Bed#: ZY7 575-01 Encounter: [id number]    Co patient seen, chart reviewed, discussed with staff  Nursing staff notes that the patient has been participating in milieu therapies  The patient has been compliant with her medications  On exam the patient states that she continues with hallucinations and states that she hears six different voices that are all named [place]  She reports that her hallucinations are no better  She states that they have been ongoing for several years and that medications do not seem to help her  She denies any command hallucinations but states that the voices do things to harm her for example she complains of pain in her hands and believes it is from "the [place]'s"  The patient denies any adverse effects with her medications  The patient does deny any suicidal or homicidal ideation  She appears to be more pleasant and denies any current depression  The patient is sleeping well and has a good appetite      Behavior over the last 24 hours:  unchanged  Sleep: normal  Appetite: normal  Medication side effects: No  ROS: no complaints  /71 (BP Location: Right arm)   Pulse 80   Temp 97 6 °F (36 4 °C) (Tympanic)   Resp 18   Ht 5' 4" (1 626 m)   Wt 61 3 kg (135 lb 2 3 oz)   SpO2 94%   BMI 23 20 kg/m²     Medications:   Current Facility-Administered Medications   Medication Dose Route Frequency    aluminum-magnesium hydroxide-simethicone (MYLANTA) 200-200-20 mg/5 mL oral suspension 30 mL  30 mL Oral Q4H PRN    asenapine (SAPHRIS) SL tablet 10 mg  10 mg Sublingual BID    haloperidol (HALDOL) tablet 2 mg  2 mg Oral HS    haloperidol (HALDOL) tablet 5 mg  5 mg Oral Q8H PRN    haloperidol lactate (HALDOL) injection 5 mg  5 mg Intramuscular Q6H PRN    ibuprofen (MOTRIN) tablet 400 mg  400 mg Oral Q6H PRN    ibuprofen (MOTRIN) tablet 600 mg  600 mg Oral Q8H PRN    ibuprofen (MOTRIN) tablet 800 mg  800 mg Oral Q8H PRN    levothyroxine tablet 50 mcg  50 mcg Oral Early Morning    LORazepam (ATIVAN) tablet 1 mg  1 mg Oral Q8H PRN    magnesium hydroxide (MILK OF MAGNESIA) 400 mg/5 mL oral suspension 30 mL  30 mL Oral Daily PRN    potassium chloride (K-DUR,KLOR-CON) CR tablet 40 mEq  40 mEq Oral Daily    sertraline (ZOLOFT) tablet 100 mg  100 mg Oral Daily    traZODone (DESYREL) tablet 100 mg  100 mg Oral HS    traZODone (DESYREL) tablet 50 mg  50 mg Oral HS PRN       Labs:   Admission on 03/04/2018   Component Date Value Ref Range Status    WBC 03/04/2018 6 47  4 31 - 10 16 Thousand/uL Final    RBC 03/04/2018 4 84  3 81 - 5 12 Million/uL Final    Hemoglobin 03/04/2018 15 2  11 5 - 15 4 g/dL Final    Hematocrit 03/04/2018 43 2  34 8 - 46 1 % Final    MCV 03/04/2018 89  82 - 98 fL Final    MCH 03/04/2018 31 4  26 8 - 34 3 pg Final    MCHC 03/04/2018 35 2  31 4 - 37 4 g/dL Final    RDW 03/04/2018 12 7  11 6 - 15 1 % Final    MPV 03/04/2018 11 4  8 9 - 12 7 fL Final    Platelets 96/72/8195 290  149 - 390 Thousands/uL Final    nRBC 03/04/2018 0  /100 WBCs Final    Neutrophils Relative 03/04/2018 58  43 - 75 % Final    Lymphocytes Relative 03/04/2018 30  14 - 44 % Final    Monocytes Relative 03/04/2018 10  4 - 12 % Final    Eosinophils Relative 03/04/2018 1  0 - 6 % Final    Basophils Relative 03/04/2018 1  0 - 1 % Final    Neutrophils Absolute 03/04/2018 3 70  1 85 - 7 62 Thousands/µL Final    Lymphocytes Absolute 03/04/2018 1 92  0 60 - 4 47 Thousands/µL Final    Monocytes Absolute 03/04/2018 0 67  0 17 - 1 22 Thousand/µL Final    Eosinophils Absolute 03/04/2018 0 09  0 00 - 0 61 Thousand/µL Final    Basophils Absolute 03/04/2018 0 08  0 00 - 0 10 Thousands/µL Final    Sodium 03/04/2018 140  136 - 145 mmol/L Final    Potassium 03/04/2018 2 9* 3 5 - 5 3 mmol/L Final    Chloride 03/04/2018 102  100 - 108 mmol/L Final    CO2 03/04/2018 32  21 - 32 mmol/L Final    Anion Gap 03/04/2018 6  4 - 13 mmol/L Final    BUN 03/04/2018 5  5 - 25 mg/dL Final    Creatinine 03/04/2018 0 75  0 60 - 1 30 mg/dL Final    Glucose 03/04/2018 111  65 - 140 mg/dL Final    Calcium 03/04/2018 8 8  8 3 - 10 1 mg/dL Final    AST 03/04/2018 14  5 - 45 U/L Final    ALT 03/04/2018 15  12 - 78 U/L Final    Alkaline Phosphatase 03/04/2018 60  46 - 116 U/L Final    Total Protein 03/04/2018 7 4  6 4 - 8 2 g/dL Final    Albumin 03/04/2018 3 6  3 5 - 5 0 g/dL Final    Total Bilirubin 03/04/2018 0 63  0 20 - 1 00 mg/dL Final    eGFR 03/04/2018 86  ml/min/1 73sq m Final    TSH 3RD GENERATON 03/04/2018 4 090* 0 358 - 3 740 uIU/mL Final    Color, UA 03/04/2018 Yellow   Final    Clarity, UA 03/04/2018 Clear   Final    Specific Gravity, UA 03/04/2018 1 003  1 003 - 1 030 Final    pH, UA 03/04/2018 6 0  4 5 - 8 0 Final    Leukocytes, UA 03/04/2018 Small* Negative Final    Nitrite, UA 03/04/2018 Negative  Negative Final    Protein, UA 03/04/2018 Negative  Negative mg/dl Final    Glucose, UA 03/04/2018 Negative  Negative mg/dl Final    Ketones, UA 03/04/2018 Negative  Negative mg/dl Final    Urobilinogen, UA 03/04/2018 1 0  0 2, 1 0 E U /dl E U /dl Final    Bilirubin, UA 03/04/2018 Negative  Negative Final    Blood, UA 03/04/2018 Negative  Negative Final    Amph/Meth UR 03/04/2018 Negative  Negative Final    Barbiturate Ur 03/04/2018 Negative  Negative Final    Benzodiazepine Urine 03/04/2018 Negative  Negative Final    Cocaine Urine 03/04/2018 Negative  Negative Final    Methadone Urine 03/04/2018 Negative  Negative Final    Opiate Urine 03/04/2018 Negative  Negative Final    PCP Ur 03/04/2018 Negative  Negative Final    THC Urine 03/04/2018 Negative  Negative Final    Ethanol Lvl 03/04/2018 <3  0 - 3 mg/dL Final    Color, UA 03/04/2018 Yellow   Final    Clarity, UA 03/04/2018 Clear   Final    pH, UA 03/04/2018 6 0  4 5 - 8 0 Final    Leukocytes, UA 03/04/2018 Small* Negative Final    Nitrite, UA 03/04/2018 Negative  Negative Final    Protein, UA 03/04/2018 Negative  Negative mg/dl Final    Glucose, UA 03/04/2018 Negative  Negative mg/dl Final    Ketones, UA 03/04/2018 Negative  Negative mg/dl Final    Urobilinogen, UA 03/04/2018 0 2  0 2, 1 0 E U /dl E U /dl Final    Bilirubin, UA 03/04/2018 Negative  Negative Final    Blood, UA 03/04/2018 Negative  Negative Final    Specific Gravity, UA 03/04/2018 <=1 005  1 003 - 1 030 Final    RBC, UA 03/04/2018 None Seen  None Seen, 0-5 /hpf Final    WBC, UA 03/04/2018 2-4* None Seen, 0-5, 5-55, 5-65 /hpf Final    Epithelial Cells 03/04/2018 None Seen  None Seen, Occasional /hpf Final    Bacteria, UA 03/04/2018 None Seen  None Seen, Occasional /hpf Final    Ventricular Rate 03/04/2018 92  BPM Final    Atrial Rate 03/04/2018 92  BPM Final    OK Interval 03/04/2018 182  ms Final    QRSD Interval 03/04/2018 80  ms Final    QT Interval 03/04/2018 352  ms Final    QTC Interval 03/04/2018 435  ms Final    P Axis 03/04/2018 74  degrees Final    QRS Axis 03/04/2018 6  degrees Final    T Wave [place] 03/04/2018 64  degrees Final    Cholesterol 03/05/2018 204* 50 - 200 mg/dL Final    Triglycerides 03/05/2018 111  <=150 mg/dL Final    HDL, Direct 03/05/2018 49  40 - 60 mg/dL Final    LDL Calculated 03/05/2018 133* 0 - 100 mg/dL Final    Sodium 03/05/2018 140  136 - 145 mmol/L Final    Potassium 03/05/2018 3 0* 3 5 - 5 3 mmol/L Final    Chloride 03/05/2018 105  100 - 108 mmol/L Final    CO2 03/05/2018 29  21 - 32 mmol/L Final    Anion Gap 03/05/2018 6  4 - 13 mmol/L Final    BUN 03/05/2018 8  5 - 25 mg/dL Final    Creatinine 03/05/2018 0 62  0 60 - 1 30 mg/dL Final    Glucose 03/05/2018 108  65 - 140 mg/dL Final    Calcium 03/05/2018 8 5  8 3 - 10 1 mg/dL Final    AST 03/05/2018 14  5 - 45 U/L Final    ALT 03/05/2018 15  12 - 78 U/L Final    Alkaline Phosphatase 03/05/2018 58  46 - 116 U/L Final    Total Protein 03/05/2018 7 0  6 4 - 8 2 g/dL Final  Albumin 03/05/2018 3 4* 3 5 - 5 0 g/dL Final    Total Bilirubin 03/05/2018 0 72  0 20 - 1 00 mg/dL Final    eGFR 03/05/2018 98  ml/min/1 73sq m Final    RPR 03/05/2018 Non-Reactive  Non-Reactive Final    Sodium 03/07/2018 142  136 - 145 mmol/L Final    Potassium 03/07/2018 3 1* 3 5 - 5 3 mmol/L Final    Chloride 03/07/2018 105  100 - 108 mmol/L Final    CO2 03/07/2018 31  21 - 32 mmol/L Final    Anion Gap 03/07/2018 6  4 - 13 mmol/L Final    BUN 03/07/2018 9  5 - 25 mg/dL Final    Creatinine 03/07/2018 0 65  0 60 - 1 30 mg/dL Final    Glucose 03/07/2018 107  65 - 140 mg/dL Final    Calcium 03/07/2018 8 4  8 3 - 10 1 mg/dL Final    eGFR 03/07/2018 96  ml/min/1 73sq m Final    Ventricular Rate 03/07/2018 69  BPM Final    Atrial Rate 03/07/2018 69  BPM Final    NC Interval 03/07/2018 166  ms Final    QRSD Interval 03/07/2018 76  ms Final    QT Interval 03/07/2018 368  ms Final    QTC Interval 03/07/2018 394  ms Final    P Axis 03/07/2018 71  degrees Final    QRS Axis 03/07/2018 13  degrees Final    T Wave [place] 03/07/2018 58  degrees Final    Sodium 03/08/2018 140  136 - 145 mmol/L Final    Potassium 03/08/2018 3 9  3 5 - 5 3 mmol/L Final    Chloride 03/08/2018 105  100 - 108 mmol/L Final    CO2 03/08/2018 31  21 - 32 mmol/L Final    Anion Gap 03/08/2018 4  4 - 13 mmol/L Final    BUN 03/08/2018 9  5 - 25 mg/dL Final    Creatinine 03/08/2018 0 57* 0 60 - 1 30 mg/dL Final    Glucose 03/08/2018 95  65 - 140 mg/dL Final    Calcium 03/08/2018 8 6  8 3 - 10 1 mg/dL Final    AST 03/08/2018 11  5 - 45 U/L Final    ALT 03/08/2018 11* 12 - 78 U/L Final    Alkaline Phosphatase 03/08/2018 50  46 - 116 U/L Final    Total Protein 03/08/2018 5 9* 6 4 - 8 2 g/dL Final    Albumin 03/08/2018 3 0* 3 5 - 5 0 g/dL Final    Total Bilirubin 03/08/2018 0 43  0 20 - 1 00 mg/dL Final    eGFR 03/08/2018 100  ml/min/1 73sq m Final    Sodium 03/10/2018 142  136 - 145 mmol/L Final    Potassium 03/10/2018 4 3  3 5 - 5 3 mmol/L Final    Chloride 03/10/2018 109* 100 - 108 mmol/L Final    CO2 03/10/2018 29  21 - 32 mmol/L Final    Anion Gap 03/10/2018 4  4 - 13 mmol/L Final    BUN 03/10/2018 9  5 - 25 mg/dL Final    Creatinine 03/10/2018 0 56* 0 60 - 1 30 mg/dL Final    Glucose 03/10/2018 96  65 - 140 mg/dL Final    Calcium 03/10/2018 8 3  8 3 - 10 1 mg/dL Final    eGFR 03/10/2018 101  ml/min/1 73sq m Final       Mental Status Evaluation:  Appearance:  age appropriate and casually dressed   Behavior:  Cooperative, good eye contact   Speech:  normal pitch and normal volume   Mood:  euthymic   Affect:  mood-congruent   Thought Process:  goal directed   Thought Content:  delusions  persecutory and paranoia   Perceptual Disturbances: Auditory hallucinations without commands   Risk Potential: Suicidal Ideations none and Homicidal Ideations none   Sensorium:  person, place and time/date   Cognition:  grossly intact   Consciousness:  alert and awake    Attention: attention span appeared shorter than expected for age   Insight:  limited   Judgment: limited   Gait/Station: normal gait/station   Motor Activity: no abnormal movements     Progress Toward Goals: minimal chnage    Assessment/Plan   Principal Problem:    Schizoaffective disorder, depressive type ([facility] [address] )      Recommended Treatment:   Continue with Saphris 10 mg sublingual b i d   Increase Haldol to 5 mg p  o  q h s  for residual hallucinations  Continue with sertraline 100 mg daily and trazodone 100 mg q h s     Disposition and discharge is pending to assisted living facility possibly [address] [place]  Continue with group therapy, milieu therapy and occupational therapy  Risks, benefits and possible side effects of Medications:   Risks, benefits, and possible side effects of medications explained to patient and patient verbalizes understanding  Counseling / Coordination of Care  Total floor / unit time spent today 25 minutes   Greater than 50% of total time was spent with the patient and / or family counseling and / or coordination of care   A description of the counseling / coordination of care:  Medication management, chart review, patient interview

## 2018-03-14 NOTE — PROGRESS NOTES
Pt calm, pleasant, cooperative and bright on approach  OOB in [place], social and interacting with peers, denies SI, HI, anxiety and depression, continues to report auditory hallucinations not command in nature  " The voices keep telling me to stay in my room for 15 minutes and do not come out" continues to report feeling " really good'  Pt attending and participating in groups, medication compliant, will continue to monitor

## 2018-03-14 NOTE — CASE MANAGEMENT
[address], of [facility] [address] [facility],  to verify their receipt of clinical  Kyrie James, their  verified receipt and is hopeful to have a female subsidized bed available in the next few days  She will be in contact once she knows  I then received a call  From Celeste Ramirez, pt's sister, and she is supportive of same, but asked if there were any sites closer to [facility]  I mentioned [facility] and she requested we contact them to see if they have any female subsidized beds available  I called and spoke to Alex Roberts, of [facility] [phone] and she currently has two subsidized beds available and will be here on 3/15 at 8:30-9:30 top meet with Candy  I notified Celeste Ramirez and gave her contact info for each of the sites so she can copntact them, directly with any questions

## 2018-03-14 NOTE — PROGRESS NOTES
Pt calm, cooperative, and visible on unit  States that she would not mind being dead due to hearing voices  Contracts for safety on the unit  Compliant with meds

## 2018-03-15 PROCEDURE — 99231 SBSQ HOSP IP/OBS SF/LOW 25: CPT | Performed by: PSYCHIATRY & NEUROLOGY

## 2018-03-15 RX ADMIN — POTASSIUM CHLORIDE 40 MEQ: 1500 TABLET, EXTENDED RELEASE ORAL at 08:08

## 2018-03-15 RX ADMIN — TRAZODONE HYDROCHLORIDE 100 MG: 100 TABLET ORAL at 21:11

## 2018-03-15 RX ADMIN — ASENAPINE MALEATE 10 MG: 10 TABLET SUBLINGUAL at 17:28

## 2018-03-15 RX ADMIN — TUBERCULIN PURIFIED PROTEIN DERIVATIVE 5 UNITS: 5 INJECTION, SOLUTION INTRADERMAL at 13:34

## 2018-03-15 RX ADMIN — SERTRALINE HYDROCHLORIDE 100 MG: 100 TABLET ORAL at 08:08

## 2018-03-15 RX ADMIN — ASENAPINE MALEATE 10 MG: 10 TABLET SUBLINGUAL at 08:08

## 2018-03-15 RX ADMIN — LEVOTHYROXINE SODIUM 50 MCG: 50 TABLET ORAL at 06:28

## 2018-03-15 RX ADMIN — HALOPERIDOL 5 MG: 5 TABLET ORAL at 21:10

## 2018-03-15 NOTE — PLAN OF CARE
Problem: Ineffective Coping  Goal: Participates in unit activities  Interventions:  - Provide therapeutic environment   - Provide required programming   - Redirect inappropriate behaviors    Outcome: Progressing  Pt attending all groups as assigned  She was able to actively engage in art relaxation  Pt becoming more assertive in her interests and repeatedly stated that when provided with seasonal [place] music today,she was irritated

## 2018-03-15 NOTE — PROGRESS NOTES
Pt visible on unit for breakfast  Pt attended group, interacting with peers in the dayroom  Pt compliant with taking medications and tolerated treatment well  Pt says "I'm doing okay "  Pt denies pain, SI, HI, and depression  Pt states she feels anxious because of the voices she hears  She said the voices are upset that she will be moving to [place] when discharged  Pt states that she feels good about moving to [place]  Will continue to monitor

## 2018-03-15 NOTE — DISCHARGE INSTR - OTHER ORDERS
Pt will be residing at:  [facility]   [address] [facility], if needed [phone]

## 2018-03-15 NOTE — PROGRESS NOTES
Progress Note - Behavioral Health   Michael Cazares 65 y o  female MRN: [id number]  Unit/Bed#: SR9 575-01 Encounter: [id number]    Patient seen, chart reviewed, discussed with staff  Nursing staff reports that the patient was social yesterday and participated in groups during the day  She continues with residual auditory hallucinations which she reports were telling her to stay in her room at times  The patient slept well through the night has good appetite  Patient states that she slept well with her medications  She denies any adverse effects with her medications  She continues with residual auditory hallucinations and states she hears six different Adarsh's  She denies any command hallucinations and states that she is not suicidal or homicidal   She has been calm and cooperative with her medications and treatment plan  She does report her depression and anxiety are improved  She is anticipating placement in an assisted living facility  She does acknowledge that her moods are much improved when she is interacting with other people and states that she does not typically have hallucinations when she is with others      Behavior over the last 24 hours:  unchanged  Sleep: normal  Appetite: normal  Medication side effects: No  ROS: no complaints  /63 (BP Location: Left arm)   Pulse 85   Temp 98 1 °F (36 7 °C) (Tympanic)   Resp 18   Ht 5' 4" (1 626 m)   Wt 61 3 kg (135 lb 2 3 oz)   SpO2 96%   BMI 23 20 kg/m²     Medications:   Current Facility-Administered Medications   Medication Dose Route Frequency    aluminum-magnesium hydroxide-simethicone (MYLANTA) 200-200-20 mg/5 mL oral suspension 30 mL  30 mL Oral Q4H PRN    asenapine (SAPHRIS) SL tablet 10 mg  10 mg Sublingual BID    haloperidol (HALDOL) tablet 5 mg  5 mg Oral Q8H PRN    haloperidol (HALDOL) tablet 5 mg  5 mg Oral HS    haloperidol lactate (HALDOL) injection 5 mg  5 mg Intramuscular Q6H PRN    ibuprofen (MOTRIN) tablet 400 mg  400 mg Oral Q6H PRN    ibuprofen (MOTRIN) tablet 600 mg  600 mg Oral Q8H PRN    ibuprofen (MOTRIN) tablet 800 mg  800 mg Oral Q8H PRN    levothyroxine tablet 50 mcg  50 mcg Oral Early Morning    LORazepam (ATIVAN) tablet 1 mg  1 mg Oral Q8H PRN    magnesium hydroxide (MILK OF MAGNESIA) 400 mg/5 mL oral suspension 30 mL  30 mL Oral Daily PRN    potassium chloride (K-DUR,KLOR-CON) CR tablet 40 mEq  40 mEq Oral Daily    sertraline (ZOLOFT) tablet 100 mg  100 mg Oral Daily    traZODone (DESYREL) tablet 100 mg  100 mg Oral HS    traZODone (DESYREL) tablet 50 mg  50 mg Oral HS PRN       Labs:   Admission on 03/04/2018   Component Date Value Ref Range Status    WBC 03/04/2018 6 47  4 31 - 10 16 Thousand/uL Final    RBC 03/04/2018 4 84  3 81 - 5 12 Million/uL Final    Hemoglobin 03/04/2018 15 2  11 5 - 15 4 g/dL Final    Hematocrit 03/04/2018 43 2  34 8 - 46 1 % Final    MCV 03/04/2018 89  82 - 98 fL Final    MCH 03/04/2018 31 4  26 8 - 34 3 pg Final    MCHC 03/04/2018 35 2  31 4 - 37 4 g/dL Final    RDW 03/04/2018 12 7  11 6 - 15 1 % Final    MPV 03/04/2018 11 4  8 9 - 12 7 fL Final    Platelets 84/99/2481 290  149 - 390 Thousands/uL Final    nRBC 03/04/2018 0  /100 WBCs Final    Neutrophils Relative 03/04/2018 58  43 - 75 % Final    Lymphocytes Relative 03/04/2018 30  14 - 44 % Final    Monocytes Relative 03/04/2018 10  4 - 12 % Final    Eosinophils Relative 03/04/2018 1  0 - 6 % Final    Basophils Relative 03/04/2018 1  0 - 1 % Final    Neutrophils Absolute 03/04/2018 3 70  1 85 - 7 62 Thousands/µL Final    Lymphocytes Absolute 03/04/2018 1 92  0 60 - 4 47 Thousands/µL Final    Monocytes Absolute 03/04/2018 0 67  0 17 - 1 22 Thousand/µL Final    Eosinophils Absolute 03/04/2018 0 09  0 00 - 0 61 Thousand/µL Final    Basophils Absolute 03/04/2018 0 08  0 00 - 0 10 Thousands/µL Final    Sodium 03/04/2018 140  136 - 145 mmol/L Final    Potassium 03/04/2018 2 9* 3 5 - 5 3 mmol/L Final    Chloride 03/04/2018 102  100 - 108 mmol/L Final    CO2 03/04/2018 32  21 - 32 mmol/L Final    Anion Gap 03/04/2018 6  4 - 13 mmol/L Final    BUN 03/04/2018 5  5 - 25 mg/dL Final    Creatinine 03/04/2018 0 75  0 60 - 1 30 mg/dL Final    Glucose 03/04/2018 111  65 - 140 mg/dL Final    Calcium 03/04/2018 8 8  8 3 - 10 1 mg/dL Final    AST 03/04/2018 14  5 - 45 U/L Final    ALT 03/04/2018 15  12 - 78 U/L Final    Alkaline Phosphatase 03/04/2018 60  46 - 116 U/L Final    Total Protein 03/04/2018 7 4  6 4 - 8 2 g/dL Final    Albumin 03/04/2018 3 6  3 5 - 5 0 g/dL Final    Total Bilirubin 03/04/2018 0 63  0 20 - 1 00 mg/dL Final    eGFR 03/04/2018 86  ml/min/1 73sq m Final    TSH 3RD GENERATON 03/04/2018 4 090* 0 358 - 3 740 uIU/mL Final    Color, UA 03/04/2018 Yellow   Final    Clarity, UA 03/04/2018 Clear   Final    Specific Gravity, UA 03/04/2018 1 003  1 003 - 1 030 Final    pH, UA 03/04/2018 6 0  4 5 - 8 0 Final    Leukocytes, UA 03/04/2018 Small* Negative Final    Nitrite, UA 03/04/2018 Negative  Negative Final    Protein, UA 03/04/2018 Negative  Negative mg/dl Final    Glucose, UA 03/04/2018 Negative  Negative mg/dl Final    Ketones, UA 03/04/2018 Negative  Negative mg/dl Final    Urobilinogen, UA 03/04/2018 1 0  0 2, 1 0 E U /dl E U /dl Final    Bilirubin, UA 03/04/2018 Negative  Negative Final    Blood, UA 03/04/2018 Negative  Negative Final    Amph/Meth UR 03/04/2018 Negative  Negative Final    Barbiturate Ur 03/04/2018 Negative  Negative Final    Benzodiazepine Urine 03/04/2018 Negative  Negative Final    Cocaine Urine 03/04/2018 Negative  Negative Final    Methadone Urine 03/04/2018 Negative  Negative Final    Opiate Urine 03/04/2018 Negative  Negative Final    PCP Ur 03/04/2018 Negative  Negative Final    THC Urine 03/04/2018 Negative  Negative Final    Ethanol Lvl 03/04/2018 <3  0 - 3 mg/dL Final    Color, UA 03/04/2018 Yellow   Final    Clarity, UA 03/04/2018 Clear   Final    pH, UA 03/04/2018 6 0  4 5 - 8 0 Final    Leukocytes, UA 03/04/2018 Small* Negative Final    Nitrite, UA 03/04/2018 Negative  Negative Final    Protein, UA 03/04/2018 Negative  Negative mg/dl Final    Glucose, UA 03/04/2018 Negative  Negative mg/dl Final    Ketones, UA 03/04/2018 Negative  Negative mg/dl Final    Urobilinogen, UA 03/04/2018 0 2  0 2, 1 0 E U /dl E U /dl Final    Bilirubin, UA 03/04/2018 Negative  Negative Final    Blood, UA 03/04/2018 Negative  Negative Final    Specific Gravity, UA 03/04/2018 <=1 005  1 003 - 1 030 Final    RBC, UA 03/04/2018 None Seen  None Seen, 0-5 /hpf Final    WBC, UA 03/04/2018 2-4* None Seen, 0-5, 5-55, 5-65 /hpf Final    Epithelial Cells 03/04/2018 None Seen  None Seen, Occasional /hpf Final    Bacteria, UA 03/04/2018 None Seen  None Seen, Occasional /hpf Final    Ventricular Rate 03/04/2018 92  BPM Final    Atrial Rate 03/04/2018 92  BPM Final    CO Interval 03/04/2018 182  ms Final    QRSD Interval 03/04/2018 80  ms Final    QT Interval 03/04/2018 352  ms Final    QTC Interval 03/04/2018 435  ms Final    P Axis 03/04/2018 74  degrees Final    QRS Axis 03/04/2018 6  degrees Final    T Wave [place] 03/04/2018 64  degrees Final    Cholesterol 03/05/2018 204* 50 - 200 mg/dL Final    Triglycerides 03/05/2018 111  <=150 mg/dL Final    HDL, Direct 03/05/2018 49  40 - 60 mg/dL Final    LDL Calculated 03/05/2018 133* 0 - 100 mg/dL Final    Sodium 03/05/2018 140  136 - 145 mmol/L Final    Potassium 03/05/2018 3 0* 3 5 - 5 3 mmol/L Final    Chloride 03/05/2018 105  100 - 108 mmol/L Final    CO2 03/05/2018 29  21 - 32 mmol/L Final    Anion Gap 03/05/2018 6  4 - 13 mmol/L Final    BUN 03/05/2018 8  5 - 25 mg/dL Final    Creatinine 03/05/2018 0 62  0 60 - 1 30 mg/dL Final    Glucose 03/05/2018 108  65 - 140 mg/dL Final    Calcium 03/05/2018 8 5  8 3 - 10 1 mg/dL Final    AST 03/05/2018 14  5 - 45 U/L Final    ALT 03/05/2018 15  12 - 78 U/L Final    Alkaline Phosphatase 03/05/2018 58  46 - 116 U/L Final    Total Protein 03/05/2018 7 0  6 4 - 8 2 g/dL Final    Albumin 03/05/2018 3 4* 3 5 - 5 0 g/dL Final    Total Bilirubin 03/05/2018 0 72  0 20 - 1 00 mg/dL Final    eGFR 03/05/2018 98  ml/min/1 73sq m Final    RPR 03/05/2018 Non-Reactive  Non-Reactive Final    Sodium 03/07/2018 142  136 - 145 mmol/L Final    Potassium 03/07/2018 3 1* 3 5 - 5 3 mmol/L Final    Chloride 03/07/2018 105  100 - 108 mmol/L Final    CO2 03/07/2018 31  21 - 32 mmol/L Final    Anion Gap 03/07/2018 6  4 - 13 mmol/L Final    BUN 03/07/2018 9  5 - 25 mg/dL Final    Creatinine 03/07/2018 0 65  0 60 - 1 30 mg/dL Final    Glucose 03/07/2018 107  65 - 140 mg/dL Final    Calcium 03/07/2018 8 4  8 3 - 10 1 mg/dL Final    eGFR 03/07/2018 96  ml/min/1 73sq m Final    Ventricular Rate 03/07/2018 69  BPM Final    Atrial Rate 03/07/2018 69  BPM Final    MT Interval 03/07/2018 166  ms Final    QRSD Interval 03/07/2018 76  ms Final    QT Interval 03/07/2018 368  ms Final    QTC Interval 03/07/2018 394  ms Final    P Axis 03/07/2018 71  degrees Final    QRS Axis 03/07/2018 13  degrees Final    T Wave [place] 03/07/2018 58  degrees Final    Sodium 03/08/2018 140  136 - 145 mmol/L Final    Potassium 03/08/2018 3 9  3 5 - 5 3 mmol/L Final    Chloride 03/08/2018 105  100 - 108 mmol/L Final    CO2 03/08/2018 31  21 - 32 mmol/L Final    Anion Gap 03/08/2018 4  4 - 13 mmol/L Final    BUN 03/08/2018 9  5 - 25 mg/dL Final    Creatinine 03/08/2018 0 57* 0 60 - 1 30 mg/dL Final    Glucose 03/08/2018 95  65 - 140 mg/dL Final    Calcium 03/08/2018 8 6  8 3 - 10 1 mg/dL Final    AST 03/08/2018 11  5 - 45 U/L Final    ALT 03/08/2018 11* 12 - 78 U/L Final    Alkaline Phosphatase 03/08/2018 50  46 - 116 U/L Final    Total Protein 03/08/2018 5 9* 6 4 - 8 2 g/dL Final    Albumin 03/08/2018 3 0* 3 5 - 5 0 g/dL Final    Total Bilirubin 03/08/2018 0 43  0 20 - 1 00 mg/dL Final    eGFR 03/08/2018 100  ml/min/1 73sq m Final    Sodium 03/10/2018 142  136 - 145 mmol/L Final    Potassium 03/10/2018 4 3  3 5 - 5 3 mmol/L Final    Chloride 03/10/2018 109* 100 - 108 mmol/L Final    CO2 03/10/2018 29  21 - 32 mmol/L Final    Anion Gap 03/10/2018 4  4 - 13 mmol/L Final    BUN 03/10/2018 9  5 - 25 mg/dL Final    Creatinine 03/10/2018 0 56* 0 60 - 1 30 mg/dL Final    Glucose 03/10/2018 96  65 - 140 mg/dL Final    Calcium 03/10/2018 8 3  8 3 - 10 1 mg/dL Final    eGFR 03/10/2018 101  ml/min/1 73sq m Final       Mental Status Evaluation:  Appearance:  age appropriate and casually dressed   Behavior:  Calm, cooperative, good eye contact   Speech:  normal pitch and normal volume   Mood:  Less anxious and less depressed   Affect:  mood-congruent   Thought Process:  goal directed   Thought Content:  No delusions voiced   Perceptual Disturbances: Auditory hallucinations without commands   Risk Potential: Suicidal Ideations none, Homicidal Ideations none and Potential for Aggression No   Sensorium:  person, place, time/date and situation   Cognition:  grossly intact   Consciousness:  alert and awake    Attention: attention span appeared shorter than expected for age   Insight:  limited   Judgment: fair   Gait/Station: normal gait/station   Motor Activity: no abnormal movements     Progress Toward Goals:  Gradually improving    Assessment/Plan   Principal Problem:    Schizoaffective disorder, depressive type ([facility] [address] )      Recommended Treatment:   Continue with medications as ordered and monitor with increase in Haldol to 5 mg at HS  Continue with Saphris 10 mg b i d   Zoloft 100 mg daily and trazodone 100 mg at HS  Disposition and discharge is pending to [facility] in the next few days if she is stable  Continue with group therapy, milieu therapy and occupational therapy        Risks, benefits and possible side effects of Medications:   Risks, benefits, and possible side effects of medications explained to patient and patient verbalizes understanding  Counseling / Coordination of Care  Total floor / unit time spent today 25 minutes  Greater than 50% of total time was spent with the patient and / or family counseling and / or coordination of care  A description of the counseling / coordination of care:  Medication management, chart review, patient interview

## 2018-03-15 NOTE — PROGRESS NOTES
Pt cooperative with care and medication compliant  Visible in dayroom and social upon approach  Denies SI  Will continue to monitor

## 2018-03-15 NOTE — CASE MANAGEMENT
Pt has signed SK[facility] for [facility] and was assessed by their  Perla Vázquez [id number] Fx: [phone]  Pt has been accepted to this site and we will work to arrange for a 3/19 transfer  Pt and her sister Fe Mead are in agreement with this plan  I have prepared and faxed the [address],  and DME to [facility] and we will be doing the PPD today to be read on 3/18  We will be faxing the medications orders to [facility] on 3/19  I have also completed and faxed the [place] [facility] evaluation to [facility]

## 2018-03-16 PROCEDURE — 99231 SBSQ HOSP IP/OBS SF/LOW 25: CPT | Performed by: PSYCHIATRY & NEUROLOGY

## 2018-03-16 RX ADMIN — LEVOTHYROXINE SODIUM 50 MCG: 50 TABLET ORAL at 06:34

## 2018-03-16 RX ADMIN — POTASSIUM CHLORIDE 40 MEQ: 1500 TABLET, EXTENDED RELEASE ORAL at 08:56

## 2018-03-16 RX ADMIN — ASENAPINE MALEATE 10 MG: 10 TABLET SUBLINGUAL at 08:56

## 2018-03-16 RX ADMIN — ASENAPINE MALEATE 10 MG: 10 TABLET SUBLINGUAL at 17:16

## 2018-03-16 RX ADMIN — TRAZODONE HYDROCHLORIDE 100 MG: 100 TABLET ORAL at 22:29

## 2018-03-16 RX ADMIN — SERTRALINE HYDROCHLORIDE 100 MG: 100 TABLET ORAL at 08:56

## 2018-03-16 RX ADMIN — HALOPERIDOL 5 MG: 5 TABLET ORAL at 22:29

## 2018-03-16 NOTE — PROGRESS NOTES
Pt alert, calm, cooperative  Attends group with limited interaction  Denies all s/s except AH  Looking forward to pending discharge  Med compliant

## 2018-03-16 NOTE — CASE MANAGEMENT
Pt signed her IMM letter and stated she is going to be discharged on Monday to [address] and her sister, Fe Mead will be driving her  Pt reports she is in agreement with this plan and has no questions or concerns

## 2018-03-16 NOTE — PROGRESS NOTES
Progress Note - [address] 65 y o  female MRN: [id number]  Unit/Bed#: UC1 575-01 Encounter: [id number]    The patient was seen for continuing care and reviewed with treatment team   No significant events in the past 24 hours  She has been sleeping and eating well  She believes that the voices are behind some of the symptoms that she is experiencing such as leg cramps  No EPS was noted    Mental Status Evaluation:  Appearance:  Adequate hygiene and grooming and Good eye contact   Behavior:  calm and cooperative   Fund of knowledge  Not assessed   Speech:   Language: Normal rate and Normal volume  No overt abnormality   Mood:  euthymic   Affect:   Associations: appropriate  Tightly connected   Thought Process:  Goal directed and coherent   Thought Content:  Delusions of persecution   Perceptual Disturbances: Auditory hallucinations without commands   Risk Potential: No suicidal or homicidal ideation   Orientation  Oriented x 3   Memory Not tested   Attention/Concentration attention span and concentration were age appropriate   Insight:  No insight   Judgment: Good judgment   Gait/Station: normal gait/station and normal balance   Motor Activity: No abnormal movement noted     Progress Toward Goals: Tolerating medications    Assessment/Plan    Principal Problem:    Schizoaffective disorder, depressive type ([facility] [address] )      Recommended Treatment:  Patient is agreeable to go to assisted living Continue with pharmacotherapy, group therapy, milieu therapy and occupational therapy    The patient will be maintained on the following medications:    Current Facility-Administered Medications:  aluminum-magnesium hydroxide-simethicone 30 mL Oral Q4H PRN Juan Luis Webster MD   asenapine 10 mg Sublingual BID Dimas Rangel MD   haloperidol 5 mg Oral Q8H PRN Juan Luis Webster MD   haloperidol 5 mg Oral HS Tawana Bloom PA-C   haloperidol lactate 5 mg Intramuscular Q6H PRN Juan Luis Webster MD   ibuprofen 400 mg Oral Q6H PRN Leah Iraheta PA-C   ibuprofen 600 mg Oral Q8H PRN Justino Martines MD   ibuprofen 800 mg Oral Q8H PRN Justino Martines MD   levothyroxine 50 mcg Oral Early Morning Steph Walters MD   LORazepam 1 mg Oral Q8H PRN Justino Martines MD   magnesium hydroxide 30 mL Oral Daily PRN Justino Martines MD   potassium chloride 40 mEq Oral Daily Justino Martines MD   sertraline 100 mg Oral Daily Steph Walters MD   traZODone 100 mg Oral HS Steph Walters MD   traZODone 50 mg Oral HS PRN Justino Martines MD   tuberculin 5 Units Intradermal Once Leah Iraheta PA-C       Risks, benefits and possible side effects of Medications:   Risks, benefits, and possible side effects of medications explained to patient and patient verbalizes understanding

## 2018-03-16 NOTE — PLAN OF CARE
Problem: DISCHARGE PLANNING  Goal: Discharge to home or other facility with appropriate resources  INTERVENTIONS:  - Identify barriers to discharge w/patient and caregiver  - Arrange for needed discharge resources and transportation as appropriate  - Identify discharge learning needs (meds, wound care, etc )  - Arrange for interpretive services to assist at discharge as needed  - Refer to Case Management Department for coordinating discharge planning if the patient needs post-hospital services based on physician/advanced practitioner order or complex needs related to functional status, cognitive ability, or social support system   Outcome: Completed Date Met: 03/16/18  Pt is scheduled for d/c 3/19/18  Pt will be residing at [facility] in [place], [place]  Outpatient appts are scheduled  Appt with her PCP is scheduled  Rx's will be faxed to [address] [place] [address] prior to d/c  Sister will , bring pt to collect clothing and personal items, and then to move into [facility]

## 2018-03-16 NOTE — PROGRESS NOTES
Patient is bright, calm and appropriate  Patient reports having AH of voices  Patient denies anxiety, depression,  CAH, SI, HI and VH  Patient is cooperative with care

## 2018-03-17 PROCEDURE — 99232 SBSQ HOSP IP/OBS MODERATE 35: CPT | Performed by: PHYSICIAN ASSISTANT

## 2018-03-17 RX ORDER — BENZTROPINE MESYLATE 0.5 MG/1
0.5 TABLET ORAL 2 TIMES DAILY
Status: DISCONTINUED | OUTPATIENT
Start: 2018-03-17 | End: 2018-03-19 | Stop reason: HOSPADM

## 2018-03-17 RX ADMIN — HALOPERIDOL 5 MG: 5 TABLET ORAL at 21:19

## 2018-03-17 RX ADMIN — ASENAPINE MALEATE 10 MG: 10 TABLET SUBLINGUAL at 17:46

## 2018-03-17 RX ADMIN — TRAZODONE HYDROCHLORIDE 100 MG: 100 TABLET ORAL at 21:19

## 2018-03-17 RX ADMIN — LEVOTHYROXINE SODIUM 50 MCG: 50 TABLET ORAL at 06:07

## 2018-03-17 RX ADMIN — POTASSIUM CHLORIDE 40 MEQ: 1500 TABLET, EXTENDED RELEASE ORAL at 09:05

## 2018-03-17 RX ADMIN — BENZTROPINE MESYLATE 0.5 MG: 0.5 TABLET ORAL at 10:11

## 2018-03-17 RX ADMIN — SERTRALINE HYDROCHLORIDE 100 MG: 100 TABLET ORAL at 09:05

## 2018-03-17 RX ADMIN — BENZTROPINE MESYLATE 0.5 MG: 0.5 TABLET ORAL at 17:45

## 2018-03-17 RX ADMIN — ASENAPINE MALEATE 10 MG: 10 TABLET SUBLINGUAL at 09:05

## 2018-03-17 NOTE — PLAN OF CARE
PSYCHOSIS     Will report no hallucinations or delusions Not Progressing        Patient reports AH    Alteration in Thoughts and Perception     Treatment Goal: Gain control of psychotic behaviors/thinking, reduce/eliminate presenting symptoms and demonstrate improved reality functioning upon discharge Progressing     Recognize dysfunctional thoughts, communicate reality-based thoughts at the time of discharge [address] Will report anxiety at manageable levels Progressing     By discharge: Patient will verbalize 2 strategies to deal with anxiety Progressing        DEPRESSION     Will be euthymic at discharge Progressing        SELF HARM/SUICIDALITY     Will have no self-injury during hospital stay Progressing

## 2018-03-17 NOTE — PROGRESS NOTES
Patient reports AH and denies all other symptoms  Patient has been social in the milieu  Patient is calm and cooperative with care

## 2018-03-17 NOTE — PROGRESS NOTES
Progress Note - Behavioral Health   Yudi Bernard 65 y o  female MRN: [id number]  Unit/Bed#: QO8 561-01 Encounter: [id number]    Assessment/Plan   Principal Problem:    Schizoaffective disorder, depressive type ([address] )      Subjective:  Patient reports feeling well  No new psychiatric complaints or concerns  Chronic auditory hallucinations of voices without commands do not interfere on ADLs  Denied other forms of psychosis and appears less paranoid  States depressive symptoms are less and denied SI  Did not appear anxious in affect  No signs of juan  Medication compliant  Reports increased mild bilateral hand and leg tremors over this past week and will trial low dose Cogentin today        Current Medications:  Current Facility-Administered Medications   Medication Dose Route Frequency    aluminum-magnesium hydroxide-simethicone (MYLANTA) 200-200-20 mg/5 mL oral suspension 30 mL  30 mL Oral Q4H PRN    asenapine (SAPHRIS) SL tablet 10 mg  10 mg Sublingual BID    benztropine (COGENTIN) tablet 0 5 mg  0 5 mg Oral BID    haloperidol (HALDOL) tablet 5 mg  5 mg Oral Q8H PRN    haloperidol (HALDOL) tablet 5 mg  5 mg Oral HS    haloperidol lactate (HALDOL) injection 5 mg  5 mg Intramuscular Q6H PRN    ibuprofen (MOTRIN) tablet 400 mg  400 mg Oral Q6H PRN    ibuprofen (MOTRIN) tablet 600 mg  600 mg Oral Q8H PRN    ibuprofen (MOTRIN) tablet 800 mg  800 mg Oral Q8H PRN    levothyroxine tablet 50 mcg  50 mcg Oral Early Morning    LORazepam (ATIVAN) tablet 1 mg  1 mg Oral Q8H PRN    magnesium hydroxide (MILK OF MAGNESIA) 400 mg/5 mL oral suspension 30 mL  30 mL Oral Daily PRN    potassium chloride (K-DUR,KLOR-CON) CR tablet 40 mEq  40 mEq Oral Daily    sertraline (ZOLOFT) tablet 100 mg  100 mg Oral Daily    traZODone (DESYREL) tablet 100 mg  100 mg Oral HS    traZODone (DESYREL) tablet 50 mg  50 mg Oral HS PRN    tuberculin injection 5 Units  5 Units Intradermal Once       Behavioral Health Medications: all current active meds have been reviewed and continue current psychiatric medications  Vitals:  Vitals:    03/17/18 0722   BP: 128/66   Pulse: 86   Resp: 18   Temp: 97 6 °F (36 4 °C)   SpO2: 93%       Laboratory results:    I have personally reviewed all pertinent laboratory/tests results  Most Recent Labs:   Lab Results   Component Value Date    WBC 6 47 03/04/2018    RBC 4 84 03/04/2018    HGB 15 2 03/04/2018    HCT 43 2 03/04/2018     03/04/2018    RDW 12 7 03/04/2018    NEUTROABS 3 70 03/04/2018     03/10/2018    K 4 3 03/10/2018     (H) 03/10/2018    CO2 29 03/10/2018    BUN 9 03/10/2018    CREATININE 0 56 (L) 03/10/2018    GLUCOSE 96 03/10/2018    CALCIUM 8 3 03/10/2018    AST 11 03/08/2018    ALT 11 (L) 03/08/2018    ALKPHOS 50 03/08/2018    PROT 5 9 (L) 03/08/2018    BILITOT 0 43 03/08/2018    CHOL 204 (H) 03/05/2018    HDL 49 03/05/2018    TRIG 111 03/05/2018    LDLCALC 133 (H) 03/05/2018    AMMONIA 34 05/16/2017    [id number] 4 090 (H) 03/04/2018    RPR Non-Reactive 03/05/2018       Psychiatric Review of Systems:  Behavior over the last 24 hours:  unchanged  Sleep: normal  Appetite: normal  Medication side effects: Yes, leg and hand tremors  ROS: no complaints    Mental Status Evaluation:  Appearance:  casually dressed   Behavior:  cooperative   Speech:  normal pitch and normal volume   Mood:  euthymic   Affect:  mood-congruent   Language appropriate   Thought Process:  logical   Thought Content:  Paranoid delusions - appear less   Perceptual Disturbances: Chronic AH without commands  Denied other forms of psychosis  Not potential for aggression   Risk Potential: Denied SI/HI   Potential for aggression: No   Sensorium:  person, place and time/date   Cognition:  grossly intact   Consciousness:  alert and awake    Attention: attention span and concentration were age appropriate   Insight:  limited   Judgment: fair   Gait/Station: normal gait/station and normal balance   Motor Activity: mild bilateral hand and leg tremors     Progress Toward Goals: progressing    Recommended Treatment: Continue with group therapy, milieu therapy and occupational therapy  1   Will trial Cogentin   5mg BID for mild hand and leg tremors  2  Continue current medications  3  Disposition planning    Risks, benefits and possible side effects of Medications:   Risks, benefits, and possible side effects of medications explained to patient and patient verbalizes understanding        Hemal Tucker PA-C

## 2018-03-17 NOTE — PROGRESS NOTES
Pt bright  Interacted well with others  Admits to continued AH, but denies all other s/s  Reported having a good day  Medication compliant  Remained calm all shift

## 2018-03-18 PROCEDURE — 99232 SBSQ HOSP IP/OBS MODERATE 35: CPT | Performed by: PHYSICIAN ASSISTANT

## 2018-03-18 RX ADMIN — BENZTROPINE MESYLATE 0.5 MG: 0.5 TABLET ORAL at 08:30

## 2018-03-18 RX ADMIN — HALOPERIDOL 5 MG: 5 TABLET ORAL at 21:14

## 2018-03-18 RX ADMIN — BENZTROPINE MESYLATE 0.5 MG: 0.5 TABLET ORAL at 17:29

## 2018-03-18 RX ADMIN — SERTRALINE HYDROCHLORIDE 100 MG: 100 TABLET ORAL at 08:30

## 2018-03-18 RX ADMIN — TRAZODONE HYDROCHLORIDE 100 MG: 100 TABLET ORAL at 21:14

## 2018-03-18 RX ADMIN — ASENAPINE MALEATE 10 MG: 10 TABLET SUBLINGUAL at 08:30

## 2018-03-18 RX ADMIN — POTASSIUM CHLORIDE 40 MEQ: 1500 TABLET, EXTENDED RELEASE ORAL at 08:30

## 2018-03-18 RX ADMIN — ASENAPINE MALEATE 10 MG: 10 TABLET SUBLINGUAL at 17:29

## 2018-03-18 RX ADMIN — LEVOTHYROXINE SODIUM 50 MCG: 50 TABLET ORAL at 06:04

## 2018-03-18 NOTE — PROGRESS NOTES
Pt calm, pleasant and brightens upon approach  Remains medication compliant and social with peers/staff  Pt denies SI/HI/VH and reports AH where voices telling her to "go back to your room or I will hurt you"  Pt states she is able to control voices by watching tv, social with peers and staying out of her room until bedtime  PPD was negative  Will continue to monitor

## 2018-03-18 NOTE — PLAN OF CARE
Alteration in Thoughts and Perception     Treatment Goal: Gain control of psychotic behaviors/thinking, reduce/eliminate presenting symptoms and demonstrate improved reality functioning upon discharge Progressing     Recognize dysfunctional thoughts, communicate reality-based thoughts at the time of discharge [address] Will report anxiety at manageable levels Progressing     By discharge: Patient will verbalize 2 strategies to deal with anxiety Progressing        DEPRESSION     Will be euthymic at discharge Progressing        Ineffective Coping     Participates in unit activities Progressing        Ineffective Coping     Participates in unit activities Progressing        Prexisting or High Potential for Compromised Skin Integrity     Skin integrity is maintained or improved Progressing        PSYCHOSIS     Will report no hallucinations or delusions Progressing        SELF HARM/SUICIDALITY     Will have no self-injury during hospital stay Progressing

## 2018-03-18 NOTE — PROGRESS NOTES
Pt OOB but seclusive to self  Continues to have AH but denies suicidal thoughts or that they are [facility] telling her to harm self  Pt was mediation complaint and cooperative  Will monitor

## 2018-03-18 NOTE — PROGRESS NOTES
Progress Note - Behavioral Health   Brayane Notice 65 y o  female MRN: [id number]  Unit/Bed#: PX7 561-01 Encounter: [id number]    Assessment/Plan   Principal Problem:    Schizoaffective disorder, depressive type ([address] )      Subjective:  Patient reports no new complaints  Tremors of legs and hands appear less  Remains with chronic auditory hallucinations of voices that do not impede on her ADLs  Able to focused and concentrate  Denied other forms of psychosis with reduction of paranoid delusions  Denied SI/HI and shows not signs of agitation  Is medication compliant  Tentative discharge tomorrow  Verbalizes readiness      Current Medications:  Current Facility-Administered Medications   Medication Dose Route Frequency    aluminum-magnesium hydroxide-simethicone (MYLANTA) 200-200-20 mg/5 mL oral suspension 30 mL  30 mL Oral Q4H PRN    asenapine (SAPHRIS) SL tablet 10 mg  10 mg Sublingual BID    benztropine (COGENTIN) tablet 0 5 mg  0 5 mg Oral BID    haloperidol (HALDOL) tablet 5 mg  5 mg Oral Q8H PRN    haloperidol (HALDOL) tablet 5 mg  5 mg Oral HS    haloperidol lactate (HALDOL) injection 5 mg  5 mg Intramuscular Q6H PRN    ibuprofen (MOTRIN) tablet 400 mg  400 mg Oral Q6H PRN    ibuprofen (MOTRIN) tablet 600 mg  600 mg Oral Q8H PRN    ibuprofen (MOTRIN) tablet 800 mg  800 mg Oral Q8H PRN    levothyroxine tablet 50 mcg  50 mcg Oral Early Morning    LORazepam (ATIVAN) tablet 1 mg  1 mg Oral Q8H PRN    magnesium hydroxide (MILK OF MAGNESIA) 400 mg/5 mL oral suspension 30 mL  30 mL Oral Daily PRN    potassium chloride (K-DUR,KLOR-CON) CR tablet 40 mEq  40 mEq Oral Daily    sertraline (ZOLOFT) tablet 100 mg  100 mg Oral Daily    traZODone (DESYREL) tablet 100 mg  100 mg Oral HS    traZODone (DESYREL) tablet 50 mg  50 mg Oral HS PRN    tuberculin injection 5 Units  5 Units Intradermal Once       Behavioral Health Medications: all current active meds have been reviewed and continue current psychiatric medications  Vitals:  Vitals:    03/18/18 0702   BP: 101/59   Pulse: 81   Resp: 17   Temp: 98 2 °F (36 8 °C)   SpO2: 93%       Laboratory results:    I have personally reviewed all pertinent laboratory/tests results  Most Recent Labs:   Lab Results   Component Value Date    WBC 6 47 03/04/2018    RBC 4 84 03/04/2018    HGB 15 2 03/04/2018    HCT 43 2 03/04/2018     03/04/2018    RDW 12 7 03/04/2018    NEUTROABS 3 70 03/04/2018     03/10/2018    K 4 3 03/10/2018     (H) 03/10/2018    CO2 29 03/10/2018    BUN 9 03/10/2018    CREATININE 0 56 (L) 03/10/2018    GLUCOSE 96 03/10/2018    CALCIUM 8 3 03/10/2018    AST 11 03/08/2018    ALT 11 (L) 03/08/2018    ALKPHOS 50 03/08/2018    PROT 5 9 (L) 03/08/2018    BILITOT 0 43 03/08/2018    CHOL 204 (H) 03/05/2018    HDL 49 03/05/2018    TRIG 111 03/05/2018    LDLCALC 133 (H) 03/05/2018    AMMONIA 34 05/16/2017    LTX2HJEUHXMK 4 090 (H) 03/04/2018    RPR Non-Reactive 03/05/2018       Psychiatric Review of Systems:  Behavior over the last 24 hours:  unchanged  Sleep: normal  Appetite: normal  Medication side effects: Yes, tremors  ROS: no complaints    Mental Status Evaluation:  Appearance:  casually dressed   Behavior:  cooperative   Speech:  normal pitch and normal volume   Mood:  euthymic   Affect:  mood-congruent   Language appropriate   Thought Process:  logical   Thought Content:  paranoid delusions - lessening   Perceptual Disturbances: Chronic AH without commands  Denied other forms of psychosis  Not potential for aggression   Risk Potential: Denied SI/HI   Potential for aggression: No   Sensorium:  person, place and time/date   Cognition:  grossly intact   Consciousness:  alert and awake    Attention: attention span and concentration were age appropriate   Insight:  limited   Judgment: fair   Gait/Station: normal gait/station and normal balance   Motor Activity: mild bilateral hand/leg tremors     Progress Toward Goals: unchanged    Recommended Treatment: Continue with group therapy, milieu therapy and occupational therapy  1   Continue current medications  2  Disposition planning    Risks, benefits and possible side effects of Medications:   Risks, benefits, and possible side effects of medications explained to patient and patient verbalizes understanding        Abhay Ballard PA-C

## 2018-03-19 VITALS
BODY MASS INDEX: 23.07 KG/M2 | SYSTOLIC BLOOD PRESSURE: 96 MMHG | HEIGHT: 64 IN | WEIGHT: 135.14 LBS | HEART RATE: 80 BPM | TEMPERATURE: 97.8 F | OXYGEN SATURATION: 95 % | DIASTOLIC BLOOD PRESSURE: 58 MMHG | RESPIRATION RATE: 16 BRPM

## 2018-03-19 PROCEDURE — 99239 HOSP IP/OBS DSCHRG MGMT >30: CPT | Performed by: PSYCHIATRY & NEUROLOGY

## 2018-03-19 RX ORDER — TRAZODONE HYDROCHLORIDE 100 MG/1
100 TABLET ORAL
Qty: 30 TABLET | Refills: 0 | Status: SHIPPED | OUTPATIENT
Start: 2018-03-19 | End: 2018-04-18

## 2018-03-19 RX ORDER — BENZTROPINE MESYLATE 0.5 MG/1
0.5 TABLET ORAL 2 TIMES DAILY
Qty: 60 TABLET | Refills: 0 | Status: SHIPPED | OUTPATIENT
Start: 2018-03-19

## 2018-03-19 RX ORDER — ASENAPINE 10 MG/1
10 TABLET SUBLINGUAL 2 TIMES DAILY
Qty: 60 TABLET | Refills: 0 | Status: SHIPPED | OUTPATIENT
Start: 2018-03-19 | End: 2018-04-18

## 2018-03-19 RX ORDER — SERTRALINE HYDROCHLORIDE 100 MG/1
100 TABLET, FILM COATED ORAL DAILY
Qty: 30 TABLET | Refills: 0 | Status: SHIPPED | OUTPATIENT
Start: 2018-03-19 | End: 2018-04-18

## 2018-03-19 RX ORDER — LEVOTHYROXINE SODIUM 0.05 MG/1
50 TABLET ORAL
Qty: 30 TABLET | Refills: 0 | Status: SHIPPED | OUTPATIENT
Start: 2018-03-20

## 2018-03-19 RX ORDER — POTASSIUM CHLORIDE 20 MEQ/1
20 TABLET, EXTENDED RELEASE ORAL DAILY
Qty: 30 TABLET | Refills: 0 | Status: SHIPPED | OUTPATIENT
Start: 2018-03-19 | End: 2018-04-18

## 2018-03-19 RX ORDER — HALOPERIDOL 5 MG
5 TABLET ORAL
Qty: 30 TABLET | Refills: 0 | Status: SHIPPED | OUTPATIENT
Start: 2018-03-19

## 2018-03-19 RX ORDER — IBUPROFEN 400 MG/1
400 TABLET ORAL EVERY 6 HOURS PRN
Qty: 30 TABLET | Refills: 0 | Status: SHIPPED | OUTPATIENT
Start: 2018-03-19

## 2018-03-19 RX ADMIN — POTASSIUM CHLORIDE 40 MEQ: 1500 TABLET, EXTENDED RELEASE ORAL at 08:53

## 2018-03-19 RX ADMIN — ASENAPINE MALEATE 10 MG: 10 TABLET SUBLINGUAL at 08:53

## 2018-03-19 RX ADMIN — SERTRALINE HYDROCHLORIDE 100 MG: 100 TABLET ORAL at 08:53

## 2018-03-19 RX ADMIN — BENZTROPINE MESYLATE 0.5 MG: 0.5 TABLET ORAL at 08:53

## 2018-03-19 RX ADMIN — LEVOTHYROXINE SODIUM 50 MCG: 50 TABLET ORAL at 06:31

## 2018-03-19 NOTE — DISCHARGE SUMMARY
Discharge Summary - [address] 65 y o  female MRN: [id number]  Unit/Bed#: WI7 561-01 Encounter: [id number]     Admission Date: 3/4/2018         Discharge Date:   March 19, 2018    Attending Psychiatrist: Zen Ortega MD    Reason for Admission/HPI:     History of Present Illness     Patient is a 65 y o  female with a history of schizoaffective disorder who was admitted to the inpatient psychiatric unit on a voluntary 201 commitment basis due to psychotic symptoms, auditory hallucinations and suicidal ideation with plan to overdose on medications  The patient states that she has been hearing the voices of six individuals all named Sveta Ewing who tell her to do certain things such as keeping her clothing on when going to bed or telling her not to shower  Patient states that she is disturbed by these voices and believes that they are real individuals and that they follow her everywhere including here in the hospital     Patient also had complaints of insomnia and poor appetite with a 10 lb weight loss over the past few weeks  She also reported in January of this year that she overdose on medication however she did not seek medical help and is uncertain what she took  Symptoms prior to admission included suicidal behavior, increased appetite and difficulty sleeping  Onset of symptoms was gradual starting a few weeks ago with gradually worsening course since that time  Stressors preceding admission included Could not state mental illness        Historical Information     Past Psychiatric History:     Past Inpatient Psychiatric Treatment:  One past inpatient psychiatric admission at [facility]  Past Outpatient Psychiatric Treatment: Does not remember the name of the psychatrist   Past Suicide attempts: yes  Past Violent behavior: no  Past Psychiatric medication trials:   Risperdal    Substance Abuse History:    Social History     Tobacco History     Smoking Status  Never Smoker    Smokeless Tobacco Use  Never Used    Tobacco Comment  pt denies smoking          Alcohol History     Alcohol Use Status  No          Drug Use     Drug Use Status  No          Sexual Activity     Sexually Active  No          Activities of Daily Living    Not Asked                 Alcohol use: denies    Recreational drug use:   denies   History of Inpatient/Outpatient rehabilitation program: no  Smoking history: denies use    Family Psychiatric History:     Psychiatric Illness: Sister - mental illness    Social History:    Education: high school diploma/GED  Learning Disabilities: none  Marital History:   Living arrangement, social support: The patient lives alone  Occupational History: on permanent disability  Functioning Relationships: good support system  Legal History: none      Traumatic History:     Abuse: none  Other Traumatic Events:none     Past Medical History:        Past Medical History:   Diagnosis Date    Anxiety     Bipolar disorder [facility] [address] )     Dementia     Disease of thyroid gland     Psychiatric illness     Psychosis     Schizoaffective disorder ([facility] )     Suicide attempt        Meds/Allergies     all current active meds have been reviewed    Allergies   Allergen Reactions    Codeine     Sulfa Antibiotics     Tylenol [Acetaminophen]        Objective     Vital signs in last 24 hours:  Temp:  [97 6 °F (36 4 °C)-97 8 °F (36 6 °C)] 97 8 °F (36 6 °C)  HR:  [77-80] 80  Resp:  [16-18] 16  BP: (94-96)/(58-59) 96/58    No intake or output data in the 24 hours ending 03/19/18 66 [facility] Course: The patient was admitted to the inpatient psychiatric unit and started on every 15 minutes precautions  During the hospitalization the patient was attending individual therapy, group therapy, milieu therapy and occupational therapy  Psychiatric medications were titrated over the hospital stay   To address depressive symptoms, psychotic symptoms, extrapyramidal symptoms due to antipsychotic medications and insomnia the patient was started on antidepressant Zoloft, antipsychotic medication Saphris, antiparkinsonian medication Cogentin and hypnotic medication Trazodone  Medication doses were titrated during the hospital course  Prior to beginning of treatment medications risks and benefits and possible side effects including risk of parkinsonian symptoms, Tardive Dyskinesia and metabolic syndrome related to treatment with antipsychotic medications were reviewed with the patient  The patient verbalized understanding and agreement for treatment  Patient's Saphris was titrated 10 mg b i d  however she continue loop residual hallucinations which were disruptive to her  Haldol was added HS and titrated to 5 mg which she tolerated well  At the end of her stay the patient continued with residual auditory hallucinations however she was free of suicidal homicidal ideation and overall much improved  Patient's symptoms improved gradually over the hospital course  At the end of treatment the patient was doing well  Mood was stable at the time of discharge  The patient denied suicidal ideation, intent or plan at the time of discharge and denied homicidal ideation, intent or plan at the time of discharge  There was no overt psychosis at the time of discharge  Sleep and appetite were improved  The patient was tolerating medications and was not reporting any significant side effects at the time of discharge  Since the patient was doing well at the end of the hospitalization, treatment team felt that the patient could be safely discharged to outpatient care  Since she was doing well at the end of the hospitalization, treatment team felt that she could be safely discharged to outpatient care  We felt that she achieved the maximum benefit of inpatient stay at that point and could now follow up with outpatient treatment      The outpatient follow up with family physician was arranged by the unit  upon discharge  Mental Status at Time of Discharge:     Appearance:  age appropriate, casually dressed   Behavior:  pleasant, cooperative, calm   Speech:  normal rate, normal volume, normal pitch   Mood:  improved, euthymic   Affect:  appropriate   Thought Process:  logical, coherent   Thought Content:  no overt delusions   Perceptual Disturbances: auditory hallucinations still present, but less frequent   Risk Potential: Suicidal ideation - None  Homicidal ideation - None  Potential for aggression - No   Sensorium:  person, place, time/date and situation   Cognition:  recent and remote memory grossly intact   Consciousness:  alert and awake    Attention: attention span and concentration are age appropriate   Insight:  fair   Judgment: improved   Gait/Station: normal gait/station   Motor Activity: abnormal movement noted: fine hand tremor present     Admission Diagnosis:  Principal Problem:    Schizoaffective disorder, depressive type ([facility] )      Discharge Diagnosis:     Principal Problem:    Schizoaffective disorder, depressive type ([facility] )  Resolved Problems:    * No resolved hospital problems   *      Lab results:    Admission on 03/04/2018   Component Date Value    WBC 03/04/2018 6 47     RBC 03/04/2018 4 84     Hemoglobin 03/04/2018 15 2     Hematocrit 03/04/2018 43 2     MCV 03/04/2018 89     MCH 03/04/2018 31 4     MCHC 03/04/2018 35 2     RDW 03/04/2018 12 7     MPV 03/04/2018 11 4     Platelets 24/71/5065 290     nRBC 03/04/2018 0     Neutrophils Relative 03/04/2018 58     Lymphocytes Relative 03/04/2018 30     Monocytes Relative 03/04/2018 10     Eosinophils Relative 03/04/2018 1     Basophils Relative 03/04/2018 1     Neutrophils Absolute 03/04/2018 3 70     Lymphocytes Absolute 03/04/2018 1 92     Monocytes Absolute 03/04/2018 0 67     Eosinophils Absolute 03/04/2018 0 09     Basophils Absolute 03/04/2018 0 08     Sodium 03/04/2018 140     Potassium 03/04/2018 2 9*    Chloride 03/04/2018 102     CO2 03/04/2018 32     Anion Gap 03/04/2018 6     BUN 03/04/2018 5     Creatinine 03/04/2018 0 75     Glucose 03/04/2018 111     Calcium 03/04/2018 8 8     AST 03/04/2018 14     ALT 03/04/2018 15     Alkaline Phosphatase 03/04/2018 60     Total Protein 03/04/2018 7 4     Albumin 03/04/2018 3 6     Total Bilirubin 03/04/2018 0 63     eGFR 03/04/2018 86     TSH 3RD GENERATON 03/04/2018 4 090*    Color, UA 03/04/2018 Yellow     Clarity, UA 03/04/2018 Clear     Specific Gravity, UA 03/04/2018 1 003     pH, UA 03/04/2018 6 0     Leukocytes, UA 03/04/2018 Small*    Nitrite, UA 03/04/2018 Negative     Protein, UA 03/04/2018 Negative     Glucose, UA 03/04/2018 Negative     Ketones, UA 03/04/2018 Negative     Urobilinogen, UA 03/04/2018 1 0     Bilirubin, UA 03/04/2018 Negative     Blood, UA 03/04/2018 Negative     Amph/Meth UR 03/04/2018 Negative     Barbiturate Ur 03/04/2018 Negative     Benzodiazepine Urine 03/04/2018 Negative     Cocaine Urine 03/04/2018 Negative     Methadone Urine 03/04/2018 Negative     Opiate Urine 03/04/2018 Negative     PCP Ur 03/04/2018 Negative     THC Urine 03/04/2018 Negative     Ethanol Lvl 03/04/2018 <3     Color, UA 03/04/2018 Yellow     Clarity, UA 03/04/2018 Clear     pH, UA 03/04/2018 6 0     Leukocytes, UA 03/04/2018 Small*    Nitrite, UA 03/04/2018 Negative     Protein, UA 03/04/2018 Negative     Glucose, UA 03/04/2018 Negative     Ketones, UA 03/04/2018 Negative     Urobilinogen, UA 03/04/2018 0 2     Bilirubin, UA 03/04/2018 Negative     Blood, UA 03/04/2018 Negative     Specific Gravity, UA 03/04/2018 <=1 005     RBC, UA 03/04/2018 None Seen     WBC, UA 03/04/2018 2-4*    Epithelial Cells 03/04/2018 None Seen     Bacteria, UA 03/04/2018 None Seen     Ventricular Rate 03/04/2018 92     Atrial Rate 03/04/2018 92     KS Interval 03/04/2018 182     QRSD Interval 03/04/2018 80     QT Interval 03/04/2018 352  QTC Interval 03/04/2018 435     P Axis 03/04/2018 74     QRS Axis 03/04/2018 6     T Wave Axis 03/04/2018 64     Cholesterol 03/05/2018 204*    Triglycerides 03/05/2018 111     HDL, Direct 03/05/2018 49     LDL Calculated 03/05/2018 133*    Sodium 03/05/2018 140     Potassium 03/05/2018 3 0*    Chloride 03/05/2018 105     CO2 03/05/2018 29     Anion Gap 03/05/2018 6     BUN 03/05/2018 8     Creatinine 03/05/2018 0 62     Glucose 03/05/2018 108     Calcium 03/05/2018 8 5     AST 03/05/2018 14     ALT 03/05/2018 15     Alkaline Phosphatase 03/05/2018 58     Total Protein 03/05/2018 7 0     Albumin 03/05/2018 3 4*    Total Bilirubin 03/05/2018 0 72     eGFR 03/05/2018 98     RPR 03/05/2018 Non-Reactive     Sodium 03/07/2018 142     Potassium 03/07/2018 3 1*    Chloride 03/07/2018 105     CO2 03/07/2018 31     Anion Gap 03/07/2018 6     BUN 03/07/2018 9     Creatinine 03/07/2018 0 65     Glucose 03/07/2018 107     Calcium 03/07/2018 8 4     eGFR 03/07/2018 96     Ventricular Rate 03/07/2018 69     Atrial Rate 03/07/2018 69     GA Interval 03/07/2018 166     QRSD Interval 03/07/2018 76     QT Interval 03/07/2018 368     QTC Interval 03/07/2018 394     P Axis 03/07/2018 71     QRS Axis 03/07/2018 13     T Wave Axis 03/07/2018 58     Sodium 03/08/2018 140     Potassium 03/08/2018 3 9     Chloride 03/08/2018 105     CO2 03/08/2018 31     Anion Gap 03/08/2018 4     BUN 03/08/2018 9     Creatinine 03/08/2018 0 57*    Glucose 03/08/2018 95     Calcium 03/08/2018 8 6     AST 03/08/2018 11     ALT 03/08/2018 11*    Alkaline Phosphatase 03/08/2018 50     Total Protein 03/08/2018 5 9*    Albumin 03/08/2018 3 0*    Total Bilirubin 03/08/2018 0 43     eGFR 03/08/2018 100     Sodium 03/10/2018 142     Potassium 03/10/2018 4 3     Chloride 03/10/2018 109*    CO2 03/10/2018 29     Anion Gap 03/10/2018 4     BUN 03/10/2018 9     Creatinine 03/10/2018 0 56*    Glucose 03/10/2018 96     Calcium 03/10/2018 8 3     eGFR 03/10/2018 101        Discharge Medications:    See after visit summary for reconciled discharge medications provided to patient and family  Discharge instructions/Information to patient and family:     See after visit summary for information provided to patient and family  Provisions for Follow-Up Care:    See after visit summary for information related to follow-up care and any pertinent home health orders  Discharge Statement     I spent 40 minutes discharging the patient  This time was spent on the day of discharge  I had direct contact with the patient on the day of discharge  Additional documentation is required if more than 30 minutes were spent on discharge:    I reviewed with Candy importance of compliance with medications and outpatient treatment after discharge  I discussed the medication regimen and possible side effects of the medications with Candy prior to discharge  At the time of discharge she was tolerating psychiatric medications  I discussed outpatient follow up with Ronen Galvan

## 2018-03-19 NOTE — NURSING NOTE
Prescriptions and discharge instructions reviewed with patient and her sister; all questions and concerns addressed and pt verbalized understanding

## 2018-03-19 NOTE — PLAN OF CARE
Problem: Ineffective Coping  Goal: Participates in unit activities  Interventions:  - Provide therapeutic environment   - Provide required programming   - Redirect inappropriate behaviors    Outcome: Completed Date Met: 03/19/18

## 2018-03-19 NOTE — PLAN OF CARE
Alteration in Thoughts and Perception     Treatment Goal: Gain control of psychotic behaviors/thinking, reduce/eliminate presenting symptoms and demonstrate improved reality functioning upon discharge Progressing     Recognize dysfunctional thoughts, communicate reality-based thoughts at the time of discharge [address] Will report anxiety at manageable levels Progressing     By discharge: Patient will verbalize 2 strategies to deal with anxiety Progressing        DEPRESSION     Will be euthymic at discharge Progressing        Ineffective Coping     Participates in unit activities Progressing        Prexisting or High Potential for Compromised Skin Integrity     Skin integrity is maintained or improved Progressing        PSYCHOSIS     Will report no hallucinations or delusions Progressing        SELF HARM/SUICIDALITY     Will have no self-injury during hospital stay Progressing

## 2018-03-19 NOTE — PROGRESS NOTES
Pt calm, pleasant and brightens on approach, remains medication compliant, visible in the milieu and social with peers  Pt denies SI/HI and reports less depression, anxiety and auditory hallucinations  Will continue to monitor

## 2018-03-19 NOTE — PROGRESS NOTES
Pt calm and OOB among peers  Non-social but pleasant on approach and medication compliant  She continues to state she has AH but at this point she feels they are not problematic    Will monitor

## 2022-01-27 PROCEDURE — U0003 INFECTIOUS AGENT DETECTION BY NUCLEIC ACID (DNA OR RNA); SEVERE ACUTE RESPIRATORY SYNDROME CORONAVIRUS 2 (SARS-COV-2) (CORONAVIRUS DISEASE [COVID-19]), AMPLIFIED PROBE TECHNIQUE, MAKING USE OF HIGH THROUGHPUT TECHNOLOGIES AS DESCRIBED BY CMS-2020-01-R: HCPCS | Performed by: INTERNAL MEDICINE

## 2022-01-27 PROCEDURE — U0005 INFEC AGEN DETEC AMPLI PROBE: HCPCS | Performed by: INTERNAL MEDICINE

## 2022-01-29 ENCOUNTER — LAB REQUISITION (OUTPATIENT)
Dept: LAB | Facility: HOSPITAL | Age: 67
End: 2022-01-29
Payer: MEDICARE

## 2022-01-29 DIAGNOSIS — Z11.59 ENCOUNTER FOR SCREENING FOR OTHER VIRAL DISEASES: ICD-10-CM

## 2022-01-29 LAB — SARS-COV-2 RNA RESP QL NAA+PROBE: NEGATIVE

## 2022-01-31 PROCEDURE — U0005 INFEC AGEN DETEC AMPLI PROBE: HCPCS | Performed by: INTERNAL MEDICINE

## 2022-01-31 PROCEDURE — U0003 INFECTIOUS AGENT DETECTION BY NUCLEIC ACID (DNA OR RNA); SEVERE ACUTE RESPIRATORY SYNDROME CORONAVIRUS 2 (SARS-COV-2) (CORONAVIRUS DISEASE [COVID-19]), AMPLIFIED PROBE TECHNIQUE, MAKING USE OF HIGH THROUGHPUT TECHNOLOGIES AS DESCRIBED BY CMS-2020-01-R: HCPCS | Performed by: INTERNAL MEDICINE

## 2022-02-01 ENCOUNTER — LAB REQUISITION (OUTPATIENT)
Dept: LAB | Facility: HOSPITAL | Age: 67
End: 2022-02-01
Payer: MEDICARE

## 2022-02-01 DIAGNOSIS — Z03.818 ENCOUNTER FOR OBSERVATION FOR SUSPECTED EXPOSURE TO OTHER BIOLOGICAL AGENTS RULED OUT: ICD-10-CM

## 2022-02-01 DIAGNOSIS — Z11.59 ENCOUNTER FOR SCREENING FOR OTHER VIRAL DISEASES: ICD-10-CM

## 2022-02-01 LAB — SARS-COV-2 RNA RESP QL NAA+PROBE: NEGATIVE

## 2022-03-15 PROCEDURE — U0005 INFEC AGEN DETEC AMPLI PROBE: HCPCS | Performed by: INTERNAL MEDICINE

## 2022-03-15 PROCEDURE — U0003 INFECTIOUS AGENT DETECTION BY NUCLEIC ACID (DNA OR RNA); SEVERE ACUTE RESPIRATORY SYNDROME CORONAVIRUS 2 (SARS-COV-2) (CORONAVIRUS DISEASE [COVID-19]), AMPLIFIED PROBE TECHNIQUE, MAKING USE OF HIGH THROUGHPUT TECHNOLOGIES AS DESCRIBED BY CMS-2020-01-R: HCPCS | Performed by: INTERNAL MEDICINE

## 2022-03-16 ENCOUNTER — LAB REQUISITION (OUTPATIENT)
Dept: LAB | Facility: HOSPITAL | Age: 67
End: 2022-03-16
Payer: MEDICARE

## 2022-03-16 DIAGNOSIS — Z03.818 ENCOUNTER FOR OBSERVATION FOR SUSPECTED EXPOSURE TO OTHER BIOLOGICAL AGENTS RULED OUT: ICD-10-CM

## 2022-03-16 DIAGNOSIS — Z11.59 ENCOUNTER FOR SCREENING FOR OTHER VIRAL DISEASES: ICD-10-CM

## 2022-03-16 LAB — SARS-COV-2 RNA RESP QL NAA+PROBE: NEGATIVE

## 2022-03-16 PROCEDURE — U0003 INFECTIOUS AGENT DETECTION BY NUCLEIC ACID (DNA OR RNA); SEVERE ACUTE RESPIRATORY SYNDROME CORONAVIRUS 2 (SARS-COV-2) (CORONAVIRUS DISEASE [COVID-19]), AMPLIFIED PROBE TECHNIQUE, MAKING USE OF HIGH THROUGHPUT TECHNOLOGIES AS DESCRIBED BY CMS-2020-01-R: HCPCS | Performed by: INTERNAL MEDICINE

## 2022-03-20 ENCOUNTER — LAB REQUISITION (OUTPATIENT)
Dept: LAB | Facility: HOSPITAL | Age: 67
End: 2022-03-20
Payer: MEDICARE

## 2022-03-20 DIAGNOSIS — Z03.818 ENCOUNTER FOR OBSERVATION FOR SUSPECTED EXPOSURE TO OTHER BIOLOGICAL AGENTS RULED OUT: ICD-10-CM

## 2022-03-20 DIAGNOSIS — Z11.59 ENCOUNTER FOR SCREENING FOR OTHER VIRAL DISEASES: ICD-10-CM

## 2022-03-20 LAB — SARS-COV-2 RNA RESP QL NAA+PROBE: NEGATIVE

## 2022-03-23 PROCEDURE — U0003 INFECTIOUS AGENT DETECTION BY NUCLEIC ACID (DNA OR RNA); SEVERE ACUTE RESPIRATORY SYNDROME CORONAVIRUS 2 (SARS-COV-2) (CORONAVIRUS DISEASE [COVID-19]), AMPLIFIED PROBE TECHNIQUE, MAKING USE OF HIGH THROUGHPUT TECHNOLOGIES AS DESCRIBED BY CMS-2020-01-R: HCPCS | Performed by: INTERNAL MEDICINE

## 2022-03-23 PROCEDURE — U0005 INFEC AGEN DETEC AMPLI PROBE: HCPCS | Performed by: INTERNAL MEDICINE

## 2022-03-24 ENCOUNTER — LAB REQUISITION (OUTPATIENT)
Dept: LAB | Facility: HOSPITAL | Age: 67
End: 2022-03-24
Payer: MEDICARE

## 2022-03-24 DIAGNOSIS — Z11.59 ENCOUNTER FOR SCREENING FOR OTHER VIRAL DISEASES: ICD-10-CM

## 2022-03-24 DIAGNOSIS — Z03.818 ENCOUNTER FOR OBSERVATION FOR SUSPECTED EXPOSURE TO OTHER BIOLOGICAL AGENTS RULED OUT: ICD-10-CM

## 2022-03-24 LAB — SARS-COV-2 RNA RESP QL NAA+PROBE: NEGATIVE

## 2022-03-28 PROCEDURE — U0003 INFECTIOUS AGENT DETECTION BY NUCLEIC ACID (DNA OR RNA); SEVERE ACUTE RESPIRATORY SYNDROME CORONAVIRUS 2 (SARS-COV-2) (CORONAVIRUS DISEASE [COVID-19]), AMPLIFIED PROBE TECHNIQUE, MAKING USE OF HIGH THROUGHPUT TECHNOLOGIES AS DESCRIBED BY CMS-2020-01-R: HCPCS | Performed by: INTERNAL MEDICINE

## 2022-03-28 PROCEDURE — U0005 INFEC AGEN DETEC AMPLI PROBE: HCPCS | Performed by: INTERNAL MEDICINE

## 2022-03-29 ENCOUNTER — LAB REQUISITION (OUTPATIENT)
Dept: LAB | Facility: HOSPITAL | Age: 67
End: 2022-03-29
Payer: MEDICARE

## 2022-03-29 DIAGNOSIS — Z11.59 ENCOUNTER FOR SCREENING FOR OTHER VIRAL DISEASES: ICD-10-CM

## 2022-03-30 LAB — SARS-COV-2 RNA RESP QL NAA+PROBE: NEGATIVE

## 2022-04-01 PROCEDURE — U0005 INFEC AGEN DETEC AMPLI PROBE: HCPCS | Performed by: INTERNAL MEDICINE

## 2022-04-01 PROCEDURE — U0003 INFECTIOUS AGENT DETECTION BY NUCLEIC ACID (DNA OR RNA); SEVERE ACUTE RESPIRATORY SYNDROME CORONAVIRUS 2 (SARS-COV-2) (CORONAVIRUS DISEASE [COVID-19]), AMPLIFIED PROBE TECHNIQUE, MAKING USE OF HIGH THROUGHPUT TECHNOLOGIES AS DESCRIBED BY CMS-2020-01-R: HCPCS | Performed by: INTERNAL MEDICINE

## 2022-04-03 ENCOUNTER — LAB REQUISITION (OUTPATIENT)
Dept: LAB | Facility: HOSPITAL | Age: 67
End: 2022-04-03
Payer: MEDICARE

## 2022-04-03 DIAGNOSIS — Z03.818 ENCOUNTER FOR OBSERVATION FOR SUSPECTED EXPOSURE TO OTHER BIOLOGICAL AGENTS RULED OUT: ICD-10-CM

## 2022-04-03 DIAGNOSIS — Z11.59 ENCOUNTER FOR SCREENING FOR OTHER VIRAL DISEASES: ICD-10-CM

## 2022-04-03 LAB — SARS-COV-2 RNA RESP QL NAA+PROBE: NEGATIVE

## 2022-04-07 PROCEDURE — 87635 SARS-COV-2 COVID-19 AMP PRB: CPT | Performed by: INTERNAL MEDICINE

## 2022-04-08 ENCOUNTER — LAB REQUISITION (OUTPATIENT)
Dept: LAB | Facility: HOSPITAL | Age: 67
End: 2022-04-08
Payer: MEDICARE

## 2022-04-08 DIAGNOSIS — Z03.818 ENCOUNTER FOR OBSERVATION FOR SUSPECTED EXPOSURE TO OTHER BIOLOGICAL AGENTS RULED OUT: ICD-10-CM

## 2022-04-08 DIAGNOSIS — Z11.59 ENCOUNTER FOR SCREENING FOR OTHER VIRAL DISEASES: ICD-10-CM

## 2022-04-08 LAB — SARS-COV-2 RNA RESP QL NAA+PROBE: NEGATIVE

## 2022-06-13 ENCOUNTER — APPOINTMENT (EMERGENCY)
Dept: RADIOLOGY | Facility: HOSPITAL | Age: 67
End: 2022-06-13
Payer: MEDICARE

## 2022-06-13 ENCOUNTER — HOSPITAL ENCOUNTER (EMERGENCY)
Facility: HOSPITAL | Age: 67
Discharge: HOME | End: 2022-06-13
Attending: EMERGENCY MEDICINE | Admitting: EMERGENCY MEDICINE
Payer: MEDICARE

## 2022-06-13 VITALS
RESPIRATION RATE: 18 BRPM | TEMPERATURE: 98 F | OXYGEN SATURATION: 95 % | HEART RATE: 71 BPM | DIASTOLIC BLOOD PRESSURE: 63 MMHG | SYSTOLIC BLOOD PRESSURE: 132 MMHG

## 2022-06-13 DIAGNOSIS — S42.309A CLOSED FRACTURE OF SHAFT OF HUMERUS: Primary | ICD-10-CM

## 2022-06-13 PROCEDURE — 96372 THER/PROPH/DIAG INJ SC/IM: CPT

## 2022-06-13 PROCEDURE — 73060 X-RAY EXAM OF HUMERUS: CPT

## 2022-06-13 PROCEDURE — 99284 EMERGENCY DEPT VISIT MOD MDM: CPT | Performed by: EMERGENCY MEDICINE

## 2022-06-13 PROCEDURE — 99284 EMERGENCY DEPT VISIT MOD MDM: CPT

## 2022-06-13 RX ORDER — OXYCODONE HYDROCHLORIDE 10 MG/1
5 TABLET ORAL EVERY 6 HOURS PRN
Qty: 8 TABLET | Refills: 0 | Status: SHIPPED | OUTPATIENT
Start: 2022-06-13 | End: 2022-06-13 | Stop reason: SDUPTHER

## 2022-06-13 RX ORDER — MORPHINE SULFATE 4 MG/ML
4 INJECTION, SOLUTION INTRAMUSCULAR; INTRAVENOUS ONCE
Status: COMPLETED | OUTPATIENT
Start: 2022-06-13 | End: 2022-06-13

## 2022-06-13 RX ORDER — OXYCODONE HYDROCHLORIDE 10 MG/1
5 TABLET ORAL EVERY 6 HOURS PRN
Qty: 8 TABLET | Refills: 0 | Status: SHIPPED | OUTPATIENT
Start: 2022-06-13 | End: 2022-06-21

## 2022-06-13 RX ADMIN — MORPHINE SULFATE 4 MG: 4 INJECTION INTRAVENOUS at 18:48

## 2022-06-13 NOTE — DISCHARGE INSTRUCTIONS
Wear sling and splint as discussed until you can see Orthopedics  Use Motrin as needed for pain  Use ice to sore areas 4 to 6 times a day for 10-15 minutes at a time  For additional pain use oxycodone 1 pill every 6 hours as needed  No driving or operating heavy machinery on this medication  Haider Sauer orthopedist's office tomorrow to get an appointment in the next few days  If he gets significant pain numbness or discoloration of your hand or arm please come to back to the ER as soon as possible

## 2022-06-13 NOTE — ED PROVIDER NOTES
History  Chief Complaint   Patient presents with    Arm Pain     Patient reports falling today and injuring right arm  Patient denies any LOC and not on BT      81-year-old female presents emergency room status post fall today while walking up a flight of steps, injuring her right shoulder and humerus  The patient denies any other significant injury and denies loss of consciousness or hitting her head  Patient states that it hurts to try to move her right shoulder and right upper extremity  Patient is not on anticoagulants  Patient is here for evaluation by ambulance  Patient is right-hand dominant  Prior to Admission Medications   Prescriptions Last Dose Informant Patient Reported?  Taking?   asenapine (SAPHRIS) 10 mg SL tablet   No No   Sig: Place 1 tablet (10 mg total) under the tongue 2 (two) times a day for 30 days   benztropine (COGENTIN) 0 5 mg tablet   No No   Sig: Take 1 tablet (0 5 mg total) by mouth 2 (two) times a day   haloperidol (HALDOL) 5 mg tablet   No No   Sig: Take 1 tablet (5 mg total) by mouth daily at bedtime   ibuprofen (MOTRIN) 400 mg tablet   No No   Sig: Take 1 tablet (400 mg total) by mouth every 6 (six) hours as needed for mild pain   levothyroxine 50 mcg tablet   No No   Sig: Take 1 tablet (50 mcg total) by mouth daily in the early morning   potassium chloride (K-DUR,KLOR-CON) 20 mEq tablet   No No   Sig: Take 1 tablet (20 mEq total) by mouth daily for 30 days   sertraline (ZOLOFT) 100 mg tablet   No No   Sig: Take 1 tablet (100 mg total) by mouth daily for 30 days   traZODone (DESYREL) 100 mg tablet   No No   Sig: Take 1 tablet (100 mg total) by mouth daily at bedtime for 30 days      Facility-Administered Medications: None       Past Medical History:   Diagnosis Date    Anxiety     Bipolar disorder (HCC)     [facility]     Disease of thyroid gland     Psychiatric illness     [facility]osis [facility]     [facility]izoaffective disorder [facility]     Suicide attempt ([facility] ) History reviewed  No pertinent surgical history  Family History   Problem Relation Age of Onset    Emphysema Mother     Cancer Father     Diabetes Neg Hx     Heart disease Neg Hx      I have reviewed and agree with the history as documented  E-Cigarette/Vaping     E-Cigarette/Vaping Substances     Social History     Tobacco Use    Smoking status: Never Smoker    Smokeless tobacco: Never Used    Tobacco comment: pt denies smoking   Substance Use Topics    Alcohol use: No    Drug use: No       Review of Systems   Eyes: Negative for pain and visual disturbance  Respiratory: Negative for cough and shortness of breath  Cardiovascular: Negative for chest pain and palpitations  Gastrointestinal: Negative for abdominal pain and vomiting  Genitourinary: Negative for dysuria and hematuria  Musculoskeletal: Positive for myalgias  Negative for arthralgias and back pain  Skin: Negative for color change and rash  Neurological: Negative for syncope  All other systems reviewed and are negative  Physical Exam  Physical Exam  Vitals and nursing note reviewed  Constitutional:       General: She is not in acute distress  Appearance: Normal appearance  She is well-developed  HENT:      Head: Normocephalic and atraumatic  Right Ear: External ear normal       Left Ear: External ear normal       Nose: Nose normal       Mouth/Throat:      Mouth: Mucous membranes are moist    Eyes:      Conjunctiva/sclera: Conjunctivae normal    Cardiovascular:      Rate and Rhythm: Normal rate and regular rhythm  Pulses: Normal pulses  Heart sounds: Normal heart sounds  No murmur heard  Pulmonary:      Effort: Pulmonary effort is normal  No respiratory distress  Breath sounds: Normal breath sounds  Abdominal:      General: Bowel sounds are normal       Palpations: Abdomen is soft  Tenderness: There is no abdominal tenderness     Musculoskeletal:         General: Tenderness present  No swelling or deformity  Cervical back: Neck supple  Comments: Tenderness to right proximal humerus  Right upper extremity pulses are 2/4  Skin:     General: Skin is warm and dry  Capillary Refill: Capillary refill takes less than 2 seconds  Neurological:      General: No focal deficit present  Mental Status: She is alert and oriented to person, place, and time  Mental status is at baseline  Psychiatric:         Mood and Affect: Mood normal          Vital Signs  ED Triage Vitals [06/13/22 1751]   Temperature Pulse Respirations Blood Pressure SpO2   98 °F (36 7 °C) 71 18 132/63 95 %      Temp Source Heart Rate Source Patient Position - Orthostatic VS BP Location FiO2 (%)   Tympanic -- Sitting Left arm --      Pain Score       10 - Worst Possible Pain           Vitals:    06/13/22 1751   BP: 132/63   Pulse: 71   Patient Position - Orthostatic VS: Sitting         Visual Acuity      ED Medications  Medications - No data to display    Diagnostic Studies  Results Reviewed     None                 XR humerus RIGHT    (Results Pending)              Procedures  Procedures         ED Course  ED Course as of 06/13/22 2119 Mon Jun 13, 2022 1843 Discussed case with Dr Irena Stanley, recommends a coaptation splint  1941 Coaptation splint applied  Patient has neurovascularly intact status of the right upper extremity after splint application  Sling applied  MDM    Disposition  Final diagnoses:   None     ED Disposition     None      Follow-up Information    None         Patient's Medications   Discharge Prescriptions    No medications on file       No discharge procedures on file      PDMP Review     None          ED Provider  Electronically Signed by           Denise Means DO  06/13/22 2119

## 2022-06-14 ENCOUNTER — APPOINTMENT (EMERGENCY)
Dept: RADIOLOGY | Facility: HOSPITAL | Age: 67
End: 2022-06-14
Payer: MEDICARE

## 2022-06-14 ENCOUNTER — HOSPITAL ENCOUNTER (EMERGENCY)
Facility: HOSPITAL | Age: 67
Discharge: HOME | End: 2022-06-14
Attending: EMERGENCY MEDICINE
Payer: MEDICARE

## 2022-06-14 VITALS
SYSTOLIC BLOOD PRESSURE: 121 MMHG | OXYGEN SATURATION: 91 % | RESPIRATION RATE: 16 BRPM | TEMPERATURE: 98.7 F | HEART RATE: 87 BPM | DIASTOLIC BLOOD PRESSURE: 58 MMHG

## 2022-06-14 DIAGNOSIS — R55 NEAR SYNCOPE: Primary | ICD-10-CM

## 2022-06-14 LAB
ALBUMIN SERPL BCP-MCNC: 4 G/DL (ref 3.5–5)
ALP SERPL-CCNC: 43 U/L (ref 34–104)
ALT SERPL W P-5'-P-CCNC: 12 U/L (ref 7–52)
ANION GAP SERPL CALCULATED.3IONS-SCNC: 9 MMOL/L (ref 4–13)
AST SERPL W P-5'-P-CCNC: 15 U/L (ref 13–39)
ATRIAL RATE: 72 BPM
BASOPHILS # BLD AUTO: 0.03 THOUSANDS/ΜL (ref 0–0.1)
BASOPHILS NFR BLD AUTO: 0 % (ref 0–1)
BILIRUB SERPL-MCNC: 0.78 MG/DL (ref 0.2–1)
BILIRUB UR QL STRIP: NEGATIVE
BUN SERPL-MCNC: 13 MG/DL (ref 5–25)
CALCIUM SERPL-MCNC: 8.8 MG/DL (ref 8.4–10.2)
CARDIAC TROPONIN I PNL SERPL HS: <2 NG/L
CARDIAC TROPONIN I PNL SERPL HS: <2 NG/L
CHLORIDE SERPL-SCNC: 100 MMOL/L (ref 96–108)
CLARITY UR: CLEAR
CO2 SERPL-SCNC: 25 MMOL/L (ref 21–32)
COLOR UR: YELLOW
CREAT SERPL-MCNC: 0.81 MG/DL (ref 0.6–1.3)
EOSINOPHIL # BLD AUTO: 0.02 THOUSAND/ΜL (ref 0–0.61)
EOSINOPHIL NFR BLD AUTO: 0 % (ref 0–6)
ERYTHROCYTE [DISTWIDTH] IN BLOOD BY AUTOMATED COUNT: 12.3 % (ref 11.6–15.1)
GFR SERPL CREATININE-BSD FRML MDRD: 75 ML/MIN/1.73SQ M
GLUCOSE SERPL-MCNC: 144 MG/DL (ref 65–140)
GLUCOSE UR STRIP-MCNC: NEGATIVE MG/DL
HCT VFR BLD AUTO: 36.5 % (ref 34.8–46.1)
HGB BLD-MCNC: 12 G/DL (ref 11.5–15.4)
HGB UR QL STRIP.AUTO: NEGATIVE
IMM GRANULOCYTES # BLD AUTO: 0.02 THOUSAND/UL (ref 0–0.2)
IMM GRANULOCYTES NFR BLD AUTO: 0 % (ref 0–2)
KETONES UR STRIP-MCNC: NEGATIVE MG/DL
LEUKOCYTE ESTERASE UR QL STRIP: NEGATIVE
LYMPHOCYTES # BLD AUTO: 1.01 THOUSANDS/ΜL (ref 0.6–4.47)
LYMPHOCYTES NFR BLD AUTO: 14 % (ref 14–44)
MAGNESIUM SERPL-MCNC: 2 MG/DL (ref 1.9–2.7)
MCH RBC QN AUTO: 31.3 PG (ref 26.8–34.3)
MCHC RBC AUTO-ENTMCNC: 32.9 G/DL (ref 31.4–37.4)
MCV RBC AUTO: 95 FL (ref 82–98)
MONOCYTES # BLD AUTO: 0.65 THOUSAND/ΜL (ref 0.17–1.22)
MONOCYTES NFR BLD AUTO: 9 % (ref 4–12)
NEUTROPHILS # BLD AUTO: 5.26 THOUSANDS/ΜL (ref 1.85–7.62)
NEUTS SEG NFR BLD AUTO: 77 % (ref 43–75)
NITRITE UR QL STRIP: NEGATIVE
NRBC BLD AUTO-RTO: 0 /100 WBCS
P AXIS: 39 DEGREES
PH UR STRIP.AUTO: 6 [PH]
PLATELET # BLD AUTO: 211 THOUSANDS/UL (ref 149–390)
PMV BLD AUTO: 11.5 FL (ref 8.9–12.7)
POTASSIUM SERPL-SCNC: 3.6 MMOL/L (ref 3.5–5.3)
PR INTERVAL: 154 MS
PROT SERPL-MCNC: 6.2 G/DL (ref 6.4–8.4)
PROT UR STRIP-MCNC: NEGATIVE MG/DL
QRS AXIS: -39 DEGREES
QRSD INTERVAL: 80 MS
QT INTERVAL: 390 MS
QTC INTERVAL: 427 MS
RBC # BLD AUTO: 3.83 MILLION/UL (ref 3.81–5.12)
SODIUM SERPL-SCNC: 134 MMOL/L (ref 135–147)
SP GR UR STRIP.AUTO: 1.01 (ref 1–1.03)
T WAVE AXIS: 26 DEGREES
UROBILINOGEN UR QL STRIP.AUTO: 0.2 E.U./DL
VENTRICULAR RATE: 72 BPM
WBC # BLD AUTO: 6.99 THOUSAND/UL (ref 4.31–10.16)

## 2022-06-14 PROCEDURE — 80053 COMPREHEN METABOLIC PANEL: CPT | Performed by: EMERGENCY MEDICINE

## 2022-06-14 PROCEDURE — 93005 ELECTROCARDIOGRAM TRACING: CPT

## 2022-06-14 PROCEDURE — 81003 URINALYSIS AUTO W/O SCOPE: CPT | Performed by: EMERGENCY MEDICINE

## 2022-06-14 PROCEDURE — 99285 EMERGENCY DEPT VISIT HI MDM: CPT

## 2022-06-14 PROCEDURE — 96361 HYDRATE IV INFUSION ADD-ON: CPT

## 2022-06-14 PROCEDURE — 84484 ASSAY OF TROPONIN QUANT: CPT | Performed by: EMERGENCY MEDICINE

## 2022-06-14 PROCEDURE — 85025 COMPLETE CBC W/AUTO DIFF WBC: CPT | Performed by: EMERGENCY MEDICINE

## 2022-06-14 PROCEDURE — 83735 ASSAY OF MAGNESIUM: CPT | Performed by: EMERGENCY MEDICINE

## 2022-06-14 PROCEDURE — 71045 X-RAY EXAM CHEST 1 VIEW: CPT

## 2022-06-14 PROCEDURE — 36415 COLL VENOUS BLD VENIPUNCTURE: CPT | Performed by: EMERGENCY MEDICINE

## 2022-06-14 PROCEDURE — 93010 ELECTROCARDIOGRAM REPORT: CPT | Performed by: INTERNAL MEDICINE

## 2022-06-14 PROCEDURE — 99285 EMERGENCY DEPT VISIT HI MDM: CPT | Performed by: EMERGENCY MEDICINE

## 2022-06-14 PROCEDURE — 96360 HYDRATION IV INFUSION INIT: CPT

## 2022-06-14 RX ADMIN — SODIUM CHLORIDE 1000 ML: 0.9 INJECTION, SOLUTION INTRAVENOUS at 07:18

## 2022-06-14 NOTE — ED NOTES
Samantha sparrow/ Isaiah from [facility]'s  Will call cab for pt's transfer back to facility  Verified staff availability when she arrives         [facility]  06/14/22 9505

## 2022-06-14 NOTE — ED PROVIDER NOTES
History  Chief Complaint   Patient presents with    Fall     Patient had episode of falling at facility, denies head injury or LOC       78-YEAR-OLD FEMALE  PMH:   Bipolar  Dementia      MODE OF TRANSPORT:   EMS    Pt sent by EMS for near fall vs near syncope      She fell yesterday  Has splint on right arm  Her pain is well controlled in the right arm  She has no other sources of pain     No CP or sob  No STEVENS  PT DENIES CP OR PRESSURE  PATIENT HAS NO SHORTNESS OF BREATH  NO   COMPLAINTS OF DIAPHORESIS  SHE DENIES ANY RADIATION OF PAIN TO THE JAW NECK OR THE BACK  PT HAS NO EPISODES PALPITATIONS     No back pain       INTERVENTIONS:   NONE      INFECTIOUS SYMPTOMS: NONE  PATIENT DENIES ANY COUGH, NO FEVERS OR CHILLS  NO URI SYMPTOMS  NO SORE THROAT  NO SINUS SYMPTOMS  NO LYMPHADENOPATHY    VTE  RISK FACTORS:  NONE  NO HISTORY OF PE OR DVT  NO LONG CAR RIDES OR PLANE RIDES  NO IMMOBILIZATION  NO RECENT SURGERY OR TRAUMA  NO HEMOPTYSIS  OTHER ASSOCIATED SYMPTOMS:  Denies n/v/d    NO URINARY COMPLAINTS: NO DYSURIA, NO HEMATURIA, NO FREQUENCY    NO HEADACHE OR DIZZINESS  NO TONGUE BITING, NO LOSS OF BOWEL OR BLADDER DURING THESE EPISODES      No other complaints       History provided by:  Patient  Syncope  Episode history: possible near syncope pta  Most recent episode: Today  Chronicity:  New  Relieved by:  Nothing  Worsened by:  Nothing  Ineffective treatments:  None tried  Associated symptoms: no chest pain, no diaphoresis, no difficulty breathing, no dizziness, no fever, no focal weakness, no headaches, no malaise/fatigue, no nausea, no palpitations, no recent fall, no recent injury, no seizures, no shortness of breath, no visual change, no vomiting and no weakness        Prior to Admission Medications   Prescriptions Last Dose Informant Patient Reported?  Taking?   asenapine (SAPHRIS) 10 mg SL tablet   No No   Sig: Place 1 tablet (10 mg total) under the tongue 2 (two) times a day for 30 days benztropine (COGENTIN) 0 5 mg tablet   No No   Sig: Take 1 tablet (0 5 mg total) by mouth 2 (two) times a day   haloperidol (HALDOL) 5 mg tablet   No No   Sig: Take 1 tablet (5 mg total) by mouth daily at bedtime   ibuprofen (MOTRIN) 400 mg tablet   No No   Sig: Take 1 tablet (400 mg total) by mouth every 6 (six) hours as needed for mild pain   levothyroxine 50 mcg tablet   No No   Sig: Take 1 tablet (50 mcg total) by mouth daily in the early morning   oxyCODONE (ROXICODONE) 10 MG TABS   No No   Sig: Take 0 5 tablets (5 mg total) by mouth every 6 (six) hours as needed for moderate pain for up to 8 days Max Daily Amount: 20 mg   potassium chloride (K-DUR,KLOR-CON) 20 mEq tablet   No No   Sig: Take 1 tablet (20 mEq total) by mouth daily for 30 days   sertraline (ZOLOFT) 100 mg tablet   No No   Sig: Take 1 tablet (100 mg total) by mouth daily for 30 days   traZODone (DESYREL) 100 mg tablet   No No   Sig: Take 1 tablet (100 mg total) by mouth daily at bedtime for 30 days      Facility-Administered Medications: None       Past Medical History:   Diagnosis Date    Anxiety     Bipolar disorder (HCC)     [facility]     Disease of thyroid gland     Psychiatric illness     [facility]     [facility] )     Suicide attempt ([facility] )        History reviewed  No pertinent surgical history  Family History   Problem Relation Age of Onset    Emphysema Mother     Cancer Father     Diabetes Neg Hx     Heart disease Neg Hx      I have reviewed and agree with the history as documented  E-Cigarette/Vaping     E-Cigarette/Vaping Substances     Social History     Tobacco Use    Smoking status: Never Smoker    Smokeless tobacco: Never Used    Tobacco comment: pt denies smoking   Substance Use Topics    Alcohol use: No    Drug use: No       Review of Systems   Constitutional: Negative for chills, diaphoresis, fatigue, fever and malaise/fatigue     Respiratory: Negative for cough, shortness of breath, wheezing and stridor  Cardiovascular: Positive for syncope  Negative for chest pain, palpitations and leg swelling  Gastrointestinal: Negative for abdominal pain, blood in stool, nausea and vomiting  Genitourinary: Negative for difficulty urinating, dysuria, flank pain, frequency and hematuria  Musculoskeletal: Negative for arthralgias, back pain, gait problem, joint swelling, myalgias, neck pain and neck stiffness  Skin: Negative for rash and wound  Neurological: Positive for syncope (possible near syncope )  Negative for dizziness, tremors, focal weakness, seizures, facial asymmetry, speech difficulty, weakness, light-headedness, numbness and headaches  All other systems reviewed and are negative  Physical Exam  Physical Exam  Constitutional:       General: She is not in acute distress  Appearance: She is well-developed  She is not ill-appearing, toxic-appearing or diaphoretic  HENT:      Head: Normocephalic and atraumatic  Right Ear: External ear normal       Left Ear: External ear normal       Nose: Nose normal  No congestion or rhinorrhea  Eyes:      General: No scleral icterus  Right eye: No discharge  Left eye: No discharge  Extraocular Movements: Extraocular movements intact  Conjunctiva/sclera: Conjunctivae normal       Pupils: Pupils are equal, round, and reactive to light  Neck:      Vascular: No JVD  Trachea: No tracheal deviation  Cardiovascular:      Rate and Rhythm: Normal rate and regular rhythm  Pulses: Normal pulses  Heart sounds: Normal heart sounds  No murmur heard  No friction rub  No gallop  Pulmonary:      Effort: Pulmonary effort is normal  No respiratory distress  Breath sounds: Normal breath sounds  No stridor  No wheezing, rhonchi or rales  Chest:      Chest wall: No tenderness  Abdominal:      General: Bowel sounds are normal  There is no distension  Palpations: Abdomen is soft   There is no mass  Tenderness: There is no abdominal tenderness  There is no right CVA tenderness, left CVA tenderness, guarding or rebound  Hernia: No hernia is present  Musculoskeletal:         General: No swelling, deformity or signs of injury  Cervical back: Normal range of motion and neck supple  No rigidity or tenderness  Right lower leg: No edema  Left lower leg: No edema  Comments: Right arm is splinted  Neurovascularly intact  Moving all other extremities with full range of motion  Besides right arm, No other signs of trauma:  No deformities  No lacerations or abrasions   Lymphadenopathy:      Cervical: No cervical adenopathy  Skin:     General: Skin is warm  Capillary Refill: Capillary refill takes less than 2 seconds  Coloration: Skin is not jaundiced or pale  Findings: No bruising, erythema, lesion or rash  Neurological:      General: No focal deficit present  Mental Status: She is alert and oriented to person, place, and time  Cranial Nerves: No cranial nerve deficit  Sensory: No sensory deficit  Motor: No weakness or abnormal muscle tone  Coordination: Coordination normal    Psychiatric:         Mood and Affect: Mood normal          Behavior: Behavior normal          Thought Content:  Thought content normal          Judgment: Judgment normal          Vital Signs  ED Triage Vitals [06/14/22 0555]   Temperature Pulse Respirations Blood Pressure SpO2   98 7 °F (37 1 °C) 70 16 113/58 93 %      Temp Source Heart Rate Source Patient Position - Orthostatic VS BP Location FiO2 (%)   Oral -- Sitting Left arm --      Pain Score       5           Vitals:    06/14/22 0700 06/14/22 0730 06/14/22 0800 06/14/22 0900   BP: 126/60 121/58 119/55 121/58   Pulse: 72 72 72 87   Patient Position - Orthostatic VS:             Visual Acuity  Visual Acuity    Flowsheet Row Most Recent Value   L Pupil Size (mm) 3   R Pupil Size (mm) 3          ED Medications  Medications   sodium chloride 0 9 % bolus 1,000 mL (0 mL Intravenous Stopped 6/14/22 0908)       Diagnostic Studies  Results Reviewed     Procedure Component Value Units Date/Time    UA w Reflex to Microscopic w Reflex to Culture [[id number]]  (Normal) Collected: 06/14/22 0836    Lab Status: Final result Specimen: Urine, Clean Catch Updated: 06/14/22 0845     Color, UA Yellow     Clarity, UA Clear     Specific Gravity, UA 1 010     pH, UA 6 0     Leukocytes, UA Negative     Nitrite, UA Negative     Protein, UA Negative mg/dl      Glucose, UA Negative mg/dl      Ketones, UA Negative mg/dl      Urobilinogen, UA 0 2 E U /dl      Bilirubin, UA Negative     Blood, UA Negative    HS Troponin I 2hr [[id number]] Collected: 06/14/22 0804    Lab Status: Final result Specimen: Blood from Arm, Left Updated: 06/14/22 0834     hs TnI 2hr <2 ng/L      Delta 2hr hsTnI --    CMP [[id number]]  (Abnormal) Collected: 06/14/22 0604    Lab Status: Final result Specimen: Blood from Arm, Left Updated: 06/14/22 0656     Sodium 134 mmol/L      Potassium 3 6 mmol/L      Chloride 100 mmol/L      CO2 25 mmol/L      ANION GAP 9 mmol/L      BUN 13 mg/dL      Creatinine 0 81 mg/dL      Glucose 144 mg/dL      Calcium 8 8 mg/dL      AST 15 U/L      ALT 12 U/L      Alkaline Phosphatase 43 U/L      Total Protein 6 2 g/dL      Albumin 4 0 g/dL      Total Bilirubin 0 78 mg/dL      eGFR 75 ml/min/1 73sq m     Narrative:      National Kidney Disease Foundation guidelines for Chronic Kidney Disease (CKD):     Stage 1 with normal or high GFR (GFR > 90 mL/min/1 73 square meters)    Stage 2 Mild CKD (GFR = 60-89 mL/min/1 73 square meters)    Stage 3A Moderate CKD (GFR = 45-59 mL/min/1 73 square meters)    Stage 3B Moderate CKD (GFR = 30-44 mL/min/1 73 square meters)    Stage 4 Severe CKD (GFR = 15-29 mL/min/1 73 square meters)    Stage 5 End Stage CKD (GFR <15 mL/min/1 73 square meters)  Note: GFR calculation is accurate only with a steady state creatinine    Magnesium [[id number]]  (Normal) Collected: 06/14/22 0604    Lab Status: Final result Specimen: Blood from Arm, Left Updated: 06/14/22 0656     Magnesium 2 0 mg/dL     HS Troponin 0hr (reflex protocol) [[id number]]  (Normal) Collected: 06/14/22 0604    Lab Status: Final result Specimen: Blood from Arm, Left Updated: 06/14/22 0638     hs TnI 0hr <2 ng/L     CBC and differential [[id number]]  (Abnormal) Collected: 06/14/22 0604    Lab Status: Final result Specimen: Blood from Arm, Left Updated: 06/14/22 0614     WBC 6 99 Thousand/uL      RBC 3 83 Million/uL      Hemoglobin 12 0 g/dL      Hematocrit 36 5 %      MCV 95 fL      MCH 31 3 pg      MCHC 32 9 g/dL      RDW 12 3 %      MPV 11 5 fL      Platelets 344 Thousands/uL      nRBC 0 /100 WBCs      Neutrophils Relative 77 %      Immat GRANS % 0 %      Lymphocytes Relative 14 %      Monocytes Relative 9 %      Eosinophils Relative 0 %      Basophils Relative 0 %      Neutrophils Absolute 5 26 Thousands/µL      Immature Grans Absolute 0 02 Thousand/uL      Lymphocytes Absolute 1 01 Thousands/µL      Monocytes Absolute 0 65 Thousand/µL      Eosinophils Absolute 0 02 Thousand/µL      Basophils Absolute 0 03 Thousands/µL                  X-ray chest 1 view portable   ED Interpretation by Cinthya Lopez MD (06/14 5977)     EXAM PERFORMED/VIEWS:  XR CHEST Portable         FINDINGS:     Cardiomediastinal silhouette appears unremarkable      The lungs are clear  No pneumothorax or pleural effusion      Visualized osseous structures appear unremarkable for the patient's age      IMPRESSION:     No focal consolidation, pleural effusion, or pneumothorax                  Final Result by Haim Cassidy MD (06/14 1029)      No acute cardiopulmonary disease                    Workstation performed: [id number]                    Procedures  Procedures         ED Course  ED Course as of 06/14/22 2112 Tue Jun 14, 2022   0600 Pt seen and evaluated  She is non toxic  Appears to be at her baseline  She is w/o focal complaints, other than right arm pain since fall yesterday  She has no focal traumatic findings other than the right arm pain:  Her spine is without tenderness  Her chest wall is without tenderness  Her abdomen is soft and nontender   0625 CBC and differential(!)   0642 hs TnI 0hr: <2  Patient remains clinically stable   0647 Dw Dr Joyce Mcclain  Will f/u on labs and assist w/ disposition                                SBIRT 20yo+    Flowsheet Row Most Recent Value   SBIRT (23 yo +)    In order to provide better care to our patients, we are screening all of our patients for alcohol and drug use  Would it be okay to ask you these screening questions? Yes Filed at: 06/14/2022 4740   Initial Alcohol Screen: US AUDIT-C     1  How often do you have a drink containing alcohol? 0 Filed at: 06/14/2022 0615   2  How many drinks containing alcohol do you have on a typical day you are drinking? 0 Filed at: 06/14/2022 0615   3a  Male UNDER 65: How often do you have five or more drinks on one occasion? 0 Filed at: 06/14/2022 0615   3b  FEMALE Any Age, or MALE 65+: How often do you have 4 or more drinks on one occassion? 0 Filed at: 06/14/2022 0615   Audit-C Score 0 Filed at: 06/14/2022 3426   ERICKSON: How many times in the past year have you    Used an illegal drug or used a prescription medication for non-medical reasons? Never Filed at: 06/14/2022 6385                    MDM    Disposition  Final diagnoses:   Near syncope     Time reflects when diagnosis was documented in both MDM as applicable and the Disposition within this note     Time User Action Codes Description Comment    6/14/2022  6:07 AM Lanette Shah Add [R55] Near syncope       ED Disposition     ED Disposition   Discharge    Condition   Stable    Date/Time   Tue Jun 14, 2022  8:47 AM    Comment   Silvestre Maravilla discharge to home/self care                 Follow-up Information     Follow up With Specialties Details Why Contact Info    Maribel Hernandez MD Nephrology, Internal Medicine Schedule an appointment as soon as possible for a visit in 2 days If symptoms worsen [place] [address]  [phone]            Discharge Medication List as of 6/14/2022  8:47 AM      CONTINUE these medications which have NOT CHANGED    Details   asenapine (SAPHRIS) 10 mg SL tablet Place 1 tablet (10 mg total) under the tongue 2 (two) times a day for 30 days, Starting Mon 3/19/2018, Until Wed 4/18/2018, Print      benztropine (COGENTIN) 0 5 mg tablet Take 1 tablet (0 5 mg total) by mouth 2 (two) times a day, Starting Mon 3/19/2018, Print      haloperidol (HALDOL) 5 mg tablet Take 1 tablet (5 mg total) by mouth daily at bedtime, Starting Mon 3/19/2018, Print      ibuprofen (MOTRIN) 400 mg tablet Take 1 tablet (400 mg total) by mouth every 6 (six) hours as needed for mild pain, Starting Mon 3/19/2018, Normal      levothyroxine 50 mcg tablet Take 1 tablet (50 mcg total) by mouth daily in the early morning, Starting Tue 3/20/2018, Print      oxyCODONE (ROXICODONE) 10 MG TABS Take 0 5 tablets (5 mg total) by mouth every 6 (six) hours as needed for moderate pain for up to 8 days Max Daily Amount: 20 mg, Starting Mon 6/13/2022, Until Tue 6/21/2022 at 2359, Normal      potassium chloride (K-DUR,KLOR-CON) 20 mEq tablet Take 1 tablet (20 mEq total) by mouth daily for 30 days, Starting Mon 3/19/2018, Until Wed 4/18/2018, Print      sertraline (ZOLOFT) 100 mg tablet Take 1 tablet (100 mg total) by mouth daily for 30 days, Starting Mon 3/19/2018, Until Wed 4/18/2018, Print      traZODone (DESYREL) 100 mg tablet Take 1 tablet (100 mg total) by mouth daily at bedtime for 30 days, Starting Mon 3/19/2018, Until Wed 4/18/2018, Print             No discharge procedures on file      PDMP Review     None          ED Provider  Electronically Signed by           Kannan Baig MD  06/14/22 [address] Franny Tapia MD  06/14/22 8766

## 2022-06-14 NOTE — ED PROCEDURE NOTE
PROCEDURE  ECG 12 Lead Documentation Only    Date/Time: 6/14/2022 6:00 AM  Performed by: Julissa Rooney MD  Authorized by: Julissa Rooney MD     Indications / Diagnosis:  Near syncope  ECG reviewed by me, the ED Provider: yes    Patient location:  ED  Previous ECG:     Previous ECG:  Compared to current    Comparison ECG info:  03/07/2018    Similarity:  Changes noted (Flattened T-waves new to since 03/07/2018)    Comparison to cardiac monitor: Yes    Interpretation:     Interpretation: non-specific    Rate:     ECG rate:  72    ECG rate assessment: normal    Rhythm:     Rhythm: sinus rhythm    Ectopy:     Ectopy: none    QRS:     QRS axis:  Left    QRS intervals:  Normal  Conduction:     Conduction: normal    ST segments:     ST segments:  Non-specific  T waves:     T waves: non-specific           Julissa Rooney MD  06/14/22 9261

## 2022-06-14 NOTE — DISCHARGE INSTRUCTIONS
RETURN IF WORSE IN ANY WAY:   WORSENING PAIN  CHEST PAIN,   SHORTNESS OF BREATH,   FEVER OR FLU LIKE SYMPTOMS,   OR NEW AND CONCERNING SYMPTOMS SIGNS OR SYMPTOMS      PLEASE CALL YOUR PRIMARY DOCTOR IN THE MORNING TO SET UP FOLLOW UP   PLEASE REVIEW THE WORK UP RESULTS WITH YOUR DOCTOR

## 2022-06-14 NOTE — ED NOTES
Per patients sister Francisco Fajardo will be sending a taxi for patient       Rk Zamarripa, SALUD  06/13/22 2050

## 2022-06-14 NOTE — ED CARE HANDOFF
Emergency Department Sign Out Note        [facility]   [facility]  See Separate Emergency Department note  The patient, Em Friend, was evaluated by the previous provider for syncope   Workup Completed:  Labs and chest x ray     ED Course / Workup Pending (followup): Patient seen examined by bedside  Awake alert oriented x3 GCS 15  Stable denies any chest pain or shortness of breath  Pending urinalysis for discharge  Patient is otherwise stable  Blood work within normal limits  states got dizzy after taking Vicodin  UA WNL  Dc home                               ED Course as of 06/14/22 0847   Tue Jun 14, 2022   1976  S/p fall humeral fracture, came with syncopal episode   Normal work up  Loren Vipulippel normal    0813 Pending urine for dc   Pt is stable  Denies any chest pain or sob   Stable      Procedures  MDM        Disposition  Final diagnoses:   Near syncope     Time reflects when diagnosis was documented in both MDM as applicable and the Disposition within this note     Time User Action Codes Description Comment    6/14/2022  6:07 AM Tree Pierce [R55] Near syncope       ED Disposition     ED Disposition   Discharge    Condition   Stable    Date/Time   Tue Jun 14, 2022  8:47 AM    Comment   Em Friend discharge to home/self care  Follow-up Information     Follow up With Specialties Details Why Contact Info    Irwin Garnett MD Nephrology, Internal Medicine Schedule an appointment as soon as possible for a visit in 2 days If symptoms worsen [address] [address]  [phone]          Patient's Medications   Discharge Prescriptions    No medications on file     No discharge procedures on file         ED Provider  Electronically Signed by     Diogenes Figueroa MD  06/14/22 3352

## 2022-06-15 ENCOUNTER — TELEPHONE (OUTPATIENT)
Dept: OBGYN CLINIC | Facility: HOSPITAL | Age: 67
End: 2022-06-15

## 2022-06-15 NOTE — TELEPHONE ENCOUNTER
[address] called schedule the patient for a follow up from the ED  She is suppose to follow up on  with Dr Brant Solorio  right Closed fracture of shaft of humerus    [phone] Fuad or Анна Acuna,  Please advise if the following patient can be forced onto the schedule:    Patient: Kirill Carson    : 1955    MRN: [id number]    Call back #: [phone]  Aureliano  or [address]: [address] [place] [address]    Reason for appointment: right Closed fracture of shaft of humerus      Requested doctor/location: Brant Solorio      Thank you

## 2022-06-15 NOTE — TELEPHONE ENCOUNTER
Spoke to Breanna, would like to schedule in [facility] pass  Force on sent to [facility]

## 2022-06-21 ENCOUNTER — OFFICE VISIT (OUTPATIENT)
Dept: OBGYN CLINIC | Facility: CLINIC | Age: 67
End: 2022-06-21
Payer: MEDICARE

## 2022-06-21 ENCOUNTER — APPOINTMENT (OUTPATIENT)
Dept: RADIOLOGY | Facility: CLINIC | Age: 67
End: 2022-06-21
Payer: MEDICARE

## 2022-06-21 VITALS
SYSTOLIC BLOOD PRESSURE: 106 MMHG | HEIGHT: 64 IN | WEIGHT: 185 LBS | HEART RATE: 96 BPM | DIASTOLIC BLOOD PRESSURE: 73 MMHG | BODY MASS INDEX: 31.58 KG/M2

## 2022-06-21 DIAGNOSIS — S42.309A CLOSED FRACTURE OF SHAFT OF HUMERUS: Primary | ICD-10-CM

## 2022-06-21 DIAGNOSIS — S42.309A CLOSED FRACTURE OF SHAFT OF HUMERUS: ICD-10-CM

## 2022-06-21 PROCEDURE — 73060 X-RAY EXAM OF HUMERUS: CPT

## 2022-06-21 PROCEDURE — 99203 OFFICE O/P NEW LOW 30 MIN: CPT | Performed by: ORTHOPAEDIC SURGERY

## 2022-06-21 PROCEDURE — 24500 CLTX HUMRL SHFT FX W/O MNPJ: CPT | Performed by: ORTHOPAEDIC SURGERY

## 2022-06-21 RX ORDER — RISPERIDONE 1 MG/1
1 TABLET, FILM COATED ORAL 2 TIMES DAILY
COMMUNITY

## 2022-06-21 RX ORDER — MIDODRINE HYDROCHLORIDE 5 MG/1
5 TABLET ORAL DAILY
COMMUNITY

## 2022-06-21 RX ORDER — MIRTAZAPINE 15 MG/1
15 TABLET, FILM COATED ORAL
COMMUNITY

## 2022-06-21 NOTE — PROGRESS NOTES
ASSESSMENT/PLAN:    Diagnoses and all orders for this visit:    Closed fracture of shaft of humerus  -     Ambulatory Referral to Orthopedic Surgery  -     XR humerus right; Future  -     Humeral Fracture Brace    Other orders  -     midodrine (PROAMATINE) 5 mg tablet; Take 5 mg by mouth daily  -     mirtazapine (REMERON) 15 mg tablet; Take 15 mg by mouth daily at bedtime  -     risperiDONE (RisperDAL) 1 mg tablet; Take 1 mg by mouth 2 (two) times a day  -     Fracture / Dislocation Treatment        X-rays of the patient's right humerus are consistent with a mildly angulated humeral shaft fracture  Possible treatment options including referral for surgical intervention were discussed with the patient  She would like to proceed with conservative treatment  We will order humeral fracture brace  She should continue to wear the splint until she receives the fracture brace  She will follow up with our office in 3 weeks with new x-rays of her right humerus  The patient is acceptable to this plan  Return in about 3 weeks (around 7/12/2022)  The patient has a minimal displaced right humeral shaft fracture  She does not want surgical intervention  She is neurovascular intact  A humeral fracture brace was ordered  Return back in 3 weeks re-evaluation with new x-rays of right humerus-two views  If her condition changes, she will not hesitate to let us know    _____________________________________________________  CHIEF COMPLAINT:  Chief Complaint   Patient presents with    Right Shoulder - Fracture    Right Upper Arm - Fracture         SUBJECTIVE:  Edward Crowley is a 78 y o  female who presents to our office complaining of right upper extremity pain  The patient states that on 06/13/2022 she tripped and fell walking up stairs and landed on her right arm  She had immediate pain  She went to the emergency department where x-rays were performed consistent with a right humeral shaft fracture    She was placed in a splint  Today she states her pain has improved  She denies any numbness or tingling  She denies any fever or chills  The following portions of the patient's history were reviewed and updated as appropriate: allergies, current medications, past family history, past medical history, past social history, past surgical history and problem list     PAST MEDICAL HISTORY:  Past Medical History:   Diagnosis Date    Anxiety     Bipolar disorder ([place] [address] )     Dementia [place] [address] )     Disease of thyroid gland     Psychiatric illness     Psychosis ([place] [address] )     Schizoaffective disorder ([place] [address] )     Suicide attempt ([place] [address] )        PAST SURGICAL HISTORY:  History reviewed  No pertinent surgical history      FAMILY HISTORY:  Family History   Problem Relation Age of Onset    Emphysema Mother     Cancer Father     Diabetes Neg Hx     Heart disease Neg Hx        SOCIAL HISTORY:  Social History     Tobacco Use    Smoking status: Never Smoker    Smokeless tobacco: Never Used    Tobacco comment: pt denies smoking   Vaping Use    Vaping Use: Never used   Substance Use Topics    Alcohol use: No    Drug use: No       MEDICATIONS:    Current Outpatient Medications:     ibuprofen (MOTRIN) 400 mg tablet, Take 1 tablet (400 mg total) by mouth every 6 (six) hours as needed for mild pain, Disp: 30 tablet, Rfl: 0    levothyroxine 50 mcg tablet, Take 1 tablet (50 mcg total) by mouth daily in the early morning, Disp: 30 tablet, Rfl: 0    midodrine (PROAMATINE) 5 mg tablet, Take 5 mg by mouth daily, Disp: , Rfl:     mirtazapine (REMERON) 15 mg tablet, Take 15 mg by mouth daily at bedtime, Disp: , Rfl:     oxyCODONE (ROXICODONE) 10 MG TABS, Take 0 5 tablets (5 mg total) by mouth every 6 (six) hours as needed for moderate pain for up to 8 days Max Daily Amount: 20 mg, Disp: 8 tablet, Rfl: 0    risperiDONE (RisperDAL) 1 mg tablet, Take 1 mg by mouth 2 (two) times a day, Disp: , Rfl:     asenapine (SAPHRIS) 10 mg SL tablet, Place 1 tablet (10 mg total) under the tongue 2 (two) times a day for 30 days, Disp: 60 tablet, Rfl: 0    benztropine (COGENTIN) 0 5 mg tablet, Take 1 tablet (0 5 mg total) by mouth 2 (two) times a day (Patient not taking: Reported on 6/21/2022), Disp: 60 tablet, Rfl: 0    haloperidol (HALDOL) 5 mg tablet, Take 1 tablet (5 mg total) by mouth daily at bedtime (Patient not taking: Reported on 6/21/2022), Disp: 30 tablet, Rfl: 0    potassium chloride (K-DUR,KLOR-CON) 20 mEq tablet, Take 1 tablet (20 mEq total) by mouth daily for 30 days, Disp: 30 tablet, Rfl: 0    sertraline (ZOLOFT) 100 mg tablet, Take 1 tablet (100 mg total) by mouth daily for 30 days, Disp: 30 tablet, Rfl: 0    traZODone (DESYREL) 100 mg tablet, Take 1 tablet (100 mg total) by mouth daily at bedtime for 30 days, Disp: 30 tablet, Rfl: 0    ALLERGIES:  Allergies   Allergen Reactions    Codeine     Sulfa Antibiotics     Tylenol [Acetaminophen]        ROS:  Review of Systems     Constitutional: Negative for fatigue, fever or loss of appetite  HENT: Negative  Respiratory: Negative for shortness of breath, dyspnea  Cardiovascular: Negative for chest pain/tightness  Gastrointestinal: Negative for abdominal pain, N/V  Endocrine: Negative for cold/heat intolerance, unexplained weight loss/gain  Genitourinary: Negative for flank pain, dysuria, hematuria  Musculoskeletal: Positive for arthralgia   Skin: Negative for rash  Neurological: Negative for numbness or tingling  Psychiatric/Behavioral: Negative for agitation  _____________________________________________________  PHYSICAL EXAMINATION:    Blood pressure 106/73, pulse 96, height 5' 4" (1 626 m), weight 83 9 kg (185 lb)  Constitutional: Oriented to person, place, and time  Appears well-developed and well-nourished  No distress  HENT:   Head: Normocephalic  Eyes: Conjunctivae are normal  Right eye exhibits no discharge  Left eye exhibits no discharge  No scleral icterus  Cardiovascular: Normal rate  Pulmonary/Chest: Effort normal    Neurological: Alert and oriented to person, place, and time  Skin: Skin is warm and dry  No rash noted  Not diaphoretic  No erythema  No pallor  Psychiatric: Normal mood and affect  Behavior is normal  Judgment and thought content normal       MUSCULOSKELETAL EXAMINATION:   Physical Exam  Ortho Exam    Right upper extremity is neurovascularly intact  Fingers are pink and mobile  Compartments are soft  There is edema present along her hand  Range of motion of the arm is limited to pain  Splint in place  Brisk cap refill  Sensation intact  Objective:  BP Readings from Last 1 Encounters:   06/21/22 106/73      Wt Readings from Last 1 Encounters:   06/21/22 83 9 kg (185 lb)        BMI:   Estimated body mass index is 31 76 kg/m² as calculated from the following:    Height as of this encounter: 5' 4" (1 626 m)  Weight as of this encounter: 83 9 kg (185 lb)  PROCEDURES PERFORMED:  Fracture / Dislocation Treatment    Date/Time: 6/21/2022 11:49 AM  Performed by: Perla Polanco DO  Authorized by: Perla Polanco DO     Patient Location:  [facility]  [place] Protocol:  Risks and benefits: risks, benefits and alternatives were discussed  Consent given by: patient  Radiology Images displayed and confirmed   If images not available, report reviewed: imaging studies available      Injury location:  Upper arm  Location details:  Right upper arm  Injury type:  Fracture  Fracture type: humeral shaft    Neurovascular status: Neurovascularly intact    Distal perfusion: normal    Neurological function: normal    Range of motion: reduced    Local anesthesia used?: No    Manipulation performed?: No    Immobilization:  Splint  Neurovascular status: Neurovascularly intact    Distal perfusion: normal    Neurological function: normal    Range of motion: unchanged    Patient tolerance:  Patient tolerated the procedure well with no immediate complications          Scribe Attestation    I,:  Cira Hernadez PA-C am acting as a scribe while in the presence of the attending physician :       I,:  Ashley Cox,  personally performed the services described in this documentation    as scribed in my presence :

## 2022-08-02 ENCOUNTER — OFFICE VISIT (OUTPATIENT)
Dept: OBGYN CLINIC | Facility: CLINIC | Age: 67
End: 2022-08-02

## 2022-08-02 ENCOUNTER — APPOINTMENT (OUTPATIENT)
Dept: RADIOLOGY | Facility: CLINIC | Age: 67
End: 2022-08-02
Payer: MEDICARE

## 2022-08-02 VITALS
HEART RATE: 66 BPM | BODY MASS INDEX: 30.39 KG/M2 | WEIGHT: 178 LBS | HEIGHT: 64 IN | SYSTOLIC BLOOD PRESSURE: 110 MMHG | DIASTOLIC BLOOD PRESSURE: 68 MMHG

## 2022-08-02 DIAGNOSIS — S42.301D CLOSED FRACTURE OF SHAFT OF RIGHT HUMERUS WITH ROUTINE HEALING, UNSPECIFIED FRACTURE MORPHOLOGY, SUBSEQUENT ENCOUNTER: Primary | ICD-10-CM

## 2022-08-02 DIAGNOSIS — S42.301D CLOSED FRACTURE OF SHAFT OF RIGHT HUMERUS WITH ROUTINE HEALING, UNSPECIFIED FRACTURE MORPHOLOGY, SUBSEQUENT ENCOUNTER: ICD-10-CM

## 2022-08-02 PROCEDURE — 99024 POSTOP FOLLOW-UP VISIT: CPT | Performed by: ORTHOPAEDIC SURGERY

## 2022-08-02 PROCEDURE — 73060 X-RAY EXAM OF HUMERUS: CPT

## 2022-08-02 NOTE — PROGRESS NOTES
Assessment/Plan:   Diagnoses and all orders for this visit:    Closed fracture of shaft of right humerus with routine healing, unspecified fracture morphology, subsequent encounter  -     XR humerus right; Future       79-year-old female now 7 weeks status post right humeral shaft fracture sustained 06/13/2022, doing well  X-rays reviewed with her in the office today  She is healing this fracture and the alignment is relatively unchanged  Unfortunately she never received her humeral fracture brace ordered last visit but she was placed into one today  She should wear this full-time except for hygiene purposes for the next 6 weeks  Remain nonweightbearing on the right upper extremity  I will see her back in 6 weeks for repeat right humerus x-rays or sooner should problems arise  The fracture is moving as 1 unit  There is no gross tenderness  She was placed in a humeral fracture brace today  Return back in 6 weeks for UA shin with new x-rays of right humerus-two views    Subjective:   Patient ID: Yudi Bernard  1955     HPI  Patient is a 78 y o  female who presents for follow-up evaluation of right humeral shaft fracture sustained 06/13/2022  Patient resides at an assisted living facility  Unfortunately she never received her humeral fracture brace and has been wearing Ortho Glass splint  She states that her pain has completely resolved, currently in the office she has no pain whatsoever  Denies any numbness or tingling  She is able to wiggle her fingers  She is here today for repeat x-rays      The following portions of the patient's history were reviewed and updated as appropriate:  Past medical history, past surgical history, Family history, social history, current medications and allergies    Past Medical History:   Diagnosis Date    Anxiety     Bipolar disorder ([facility] )     [facility]     Disease of thyroid gland     Psychiatric illness     [facility]osis [facility]     Schizoaffective disorder ([facility] )     Suicide [facility])        History reviewed  No pertinent surgical history      Family History   Problem Relation Age of Onset    Emphysema Mother     Cancer Father     Diabetes Neg Hx     Heart disease Neg Hx        Social History     Socioeconomic History    Marital status:      Spouse name: None    Number of children: None    Years of education: None    Highest education level: None   Occupational History    Occupation: SSI disability   Tobacco Use    Smoking status: Never Smoker    Smokeless tobacco: Never Used    Tobacco comment: pt denies smoking   Vaping Use    Vaping Use: Never used   Substance and Sexual Activity    Alcohol use: No    Drug use: No    Sexual activity: Never   Other Topics Concern    None   Social History Narrative    None     Social Determinants of Health     Financial Resource Strain: Not on file   Food Insecurity: Not on file   Transportation Needs: Not on file   Physical Activity: Not on file   Stress: Not on file   Social Connections: Not on file   Intimate Partner Violence: Not on file   Housing Stability: Not on file         Current Outpatient Medications:     ibuprofen (MOTRIN) 400 mg tablet, Take 1 tablet (400 mg total) by mouth every 6 (six) hours as needed for mild pain, Disp: 30 tablet, Rfl: 0    levothyroxine 50 mcg tablet, Take 1 tablet (50 mcg total) by mouth daily in the early morning, Disp: 30 tablet, Rfl: 0    midodrine (PROAMATINE) 5 mg tablet, Take 5 mg by mouth daily, Disp: , Rfl:     mirtazapine (REMERON) 15 mg tablet, Take 15 mg by mouth daily at bedtime, Disp: , Rfl:     risperiDONE (RisperDAL) 1 mg tablet, Take 1 mg by mouth 2 (two) times a day, Disp: , Rfl:     asenapine (SAPHRIS) 10 mg SL tablet, Place 1 tablet (10 mg total) under the tongue 2 (two) times a day for 30 days, Disp: 60 tablet, Rfl: 0    benztropine (COGENTIN) 0 5 mg tablet, Take 1 tablet (0 5 mg total) by mouth 2 (two) times a day (Patient not taking: No sig reported), Disp: 60 tablet, Rfl: 0    haloperidol (HALDOL) 5 mg tablet, Take 1 tablet (5 mg total) by mouth daily at bedtime (Patient not taking: No sig reported), Disp: 30 tablet, Rfl: 0    potassium chloride (K-DUR,KLOR-CON) 20 mEq tablet, Take 1 tablet (20 mEq total) by mouth daily for 30 days, Disp: 30 tablet, Rfl: 0    sertraline (ZOLOFT) 100 mg tablet, Take 1 tablet (100 mg total) by mouth daily for 30 days, Disp: 30 tablet, Rfl: 0    traZODone (DESYREL) 100 mg tablet, Take 1 tablet (100 mg total) by mouth daily at bedtime for 30 days, Disp: 30 tablet, Rfl: 0    Allergies   Allergen Reactions    Codeine     Sulfa Antibiotics     Tylenol [Acetaminophen]        Review of Systems   Constitutional: Negative for appetite change and unexpected weight change  HENT: Negative for congestion and trouble swallowing  Eyes: Negative for redness  Respiratory: Negative for cough and shortness of breath  Cardiovascular: Negative for chest pain and palpitations  Gastrointestinal: Negative for nausea and vomiting  Endocrine: Negative for cold intolerance and heat intolerance  Musculoskeletal: Negative for gait problem  Skin: Negative for rash  Neurological: Negative for numbness  Objective:  /68 (BP Location: Left arm, Patient Position: Sitting, Cuff Size: Standard)   Pulse 66   Ht 5' 4" (1 626 m)   Wt 80 7 kg (178 lb)   BMI 30 55 kg/m²     Ortho Exam  Right upper arm  Skin intact   No significant swelling or ecchymosis   TTP over humeral shaft, improving  ROM strength testing deferred due to fracture   Neurovascularly intact distally    Physical Exam  Vitals and nursing note reviewed  Constitutional:       General: She is not in acute distress  Appearance: She is well-developed  HENT:      Head: Normocephalic and atraumatic  Eyes:      Conjunctiva/sclera: Conjunctivae normal    Cardiovascular:      Rate and Rhythm: Normal rate and regular rhythm     Pulmonary: Effort: Pulmonary effort is normal  No respiratory distress  Breath sounds: Normal breath sounds  Abdominal:      Palpations: Abdomen is soft  Tenderness: There is no abdominal tenderness  Musculoskeletal:      Cervical back: Neck supple  Skin:     General: Skin is warm and dry  Neurological:      Mental Status: She is alert  Diagnostic Test Review:  X-rays of the right humerus performed today demonstrate maintained alignment of humeral shaft fracture with surrounding callus formation  Procedures   No procedures performed today      Scribe Attestation    I,:  Shashank Chiang am acting as a scribe while in the presence of the attending physician :       I,:  Abdulkadir Thurman DO personally performed the services described in this documentation    as scribed in my presence :

## 2022-08-18 PROCEDURE — U0003 INFECTIOUS AGENT DETECTION BY NUCLEIC ACID (DNA OR RNA); SEVERE ACUTE RESPIRATORY SYNDROME CORONAVIRUS 2 (SARS-COV-2) (CORONAVIRUS DISEASE [COVID-19]), AMPLIFIED PROBE TECHNIQUE, MAKING USE OF HIGH THROUGHPUT TECHNOLOGIES AS DESCRIBED BY CMS-2020-01-R: HCPCS | Performed by: INTERNAL MEDICINE

## 2022-08-18 PROCEDURE — U0005 INFEC AGEN DETEC AMPLI PROBE: HCPCS | Performed by: INTERNAL MEDICINE

## 2022-08-19 ENCOUNTER — LAB REQUISITION (OUTPATIENT)
Dept: LAB | Facility: HOSPITAL | Age: 67
End: 2022-08-19
Payer: MEDICARE

## 2022-08-19 DIAGNOSIS — Z03.818 ENCOUNTER FOR OBSERVATION FOR SUSPECTED EXPOSURE TO OTHER BIOLOGICAL AGENTS RULED OUT: ICD-10-CM

## 2022-08-19 DIAGNOSIS — Z11.59 ENCOUNTER FOR SCREENING FOR OTHER VIRAL DISEASES: ICD-10-CM

## 2022-08-19 LAB — SARS-COV-2 RNA RESP QL NAA+PROBE: NEGATIVE

## 2022-08-24 PROCEDURE — U0003 INFECTIOUS AGENT DETECTION BY NUCLEIC ACID (DNA OR RNA); SEVERE ACUTE RESPIRATORY SYNDROME CORONAVIRUS 2 (SARS-COV-2) (CORONAVIRUS DISEASE [COVID-19]), AMPLIFIED PROBE TECHNIQUE, MAKING USE OF HIGH THROUGHPUT TECHNOLOGIES AS DESCRIBED BY CMS-2020-01-R: HCPCS | Performed by: INTERNAL MEDICINE

## 2022-08-24 PROCEDURE — U0005 INFEC AGEN DETEC AMPLI PROBE: HCPCS | Performed by: INTERNAL MEDICINE

## 2022-08-25 ENCOUNTER — LAB REQUISITION (OUTPATIENT)
Dept: LAB | Facility: HOSPITAL | Age: 67
End: 2022-08-25
Payer: MEDICARE

## 2022-08-25 DIAGNOSIS — Z03.818 ENCOUNTER FOR OBSERVATION FOR SUSPECTED EXPOSURE TO OTHER BIOLOGICAL AGENTS RULED OUT: ICD-10-CM

## 2022-08-25 DIAGNOSIS — Z11.59 ENCOUNTER FOR SCREENING FOR OTHER VIRAL DISEASES: ICD-10-CM

## 2022-08-25 LAB — SARS-COV-2 RNA RESP QL NAA+PROBE: NEGATIVE

## 2022-09-08 ENCOUNTER — LAB REQUISITION (OUTPATIENT)
Dept: LAB | Facility: HOSPITAL | Age: 67
End: 2022-09-08
Payer: MEDICARE

## 2022-09-08 DIAGNOSIS — Z00.00 ENCOUNTER FOR GENERAL ADULT MEDICAL EXAMINATION WITHOUT ABNORMAL FINDINGS: ICD-10-CM

## 2022-09-08 LAB
25(OH)D3 SERPL-MCNC: 110.4 NG/ML (ref 30–100)
ALBUMIN SERPL BCP-MCNC: 3.4 G/DL (ref 3.5–5)
ALP SERPL-CCNC: 70 U/L (ref 46–116)
ALT SERPL W P-5'-P-CCNC: 18 U/L (ref 12–78)
ANION GAP SERPL CALCULATED.3IONS-SCNC: 1 MMOL/L (ref 4–13)
AST SERPL W P-5'-P-CCNC: 15 U/L (ref 5–45)
BASOPHILS # BLD AUTO: 0.08 THOUSANDS/ΜL (ref 0–0.1)
BASOPHILS NFR BLD AUTO: 2 % (ref 0–1)
BILIRUB SERPL-MCNC: 0.46 MG/DL (ref 0.2–1)
BUN SERPL-MCNC: 10 MG/DL (ref 5–25)
CALCIUM ALBUM COR SERPL-MCNC: 9 MG/DL (ref 8.3–10.1)
CALCIUM SERPL-MCNC: 8.5 MG/DL (ref 8.3–10.1)
CHLORIDE SERPL-SCNC: 107 MMOL/L (ref 96–108)
CHOLEST SERPL-MCNC: 253 MG/DL
CO2 SERPL-SCNC: 31 MMOL/L (ref 21–32)
CREAT SERPL-MCNC: 0.9 MG/DL (ref 0.6–1.3)
EOSINOPHIL # BLD AUTO: 0.09 THOUSAND/ΜL (ref 0–0.61)
EOSINOPHIL NFR BLD AUTO: 2 % (ref 0–6)
ERYTHROCYTE [DISTWIDTH] IN BLOOD BY AUTOMATED COUNT: 13.8 % (ref 11.6–15.1)
EST. AVERAGE GLUCOSE BLD GHB EST-MCNC: 111 MG/DL
GFR SERPL CREATININE-BSD FRML MDRD: 66 ML/MIN/1.73SQ M
GLUCOSE SERPL-MCNC: 90 MG/DL (ref 65–140)
HBA1C MFR BLD: 5.5 %
HCT VFR BLD AUTO: 45.8 % (ref 34.8–46.1)
HDLC SERPL-MCNC: 39 MG/DL
HGB BLD-MCNC: 14.1 G/DL (ref 11.5–15.4)
IMM GRANULOCYTES # BLD AUTO: 0.01 THOUSAND/UL (ref 0–0.2)
IMM GRANULOCYTES NFR BLD AUTO: 0 % (ref 0–2)
LDLC SERPL CALC-MCNC: 182 MG/DL (ref 0–100)
LYMPHOCYTES # BLD AUTO: 1.96 THOUSANDS/ΜL (ref 0.6–4.47)
LYMPHOCYTES NFR BLD AUTO: 49 % (ref 14–44)
MCH RBC QN AUTO: 31 PG (ref 26.8–34.3)
MCHC RBC AUTO-ENTMCNC: 30.8 G/DL (ref 31.4–37.4)
MCV RBC AUTO: 101 FL (ref 82–98)
MONOCYTES # BLD AUTO: 0.31 THOUSAND/ΜL (ref 0.17–1.22)
MONOCYTES NFR BLD AUTO: 8 % (ref 4–12)
NEUTROPHILS # BLD AUTO: 1.57 THOUSANDS/ΜL (ref 1.85–7.62)
NEUTS SEG NFR BLD AUTO: 39 % (ref 43–75)
NONHDLC SERPL-MCNC: 214 MG/DL
NRBC BLD AUTO-RTO: 0 /100 WBCS
PLATELET # BLD AUTO: 202 THOUSANDS/UL (ref 149–390)
PMV BLD AUTO: 12.1 FL (ref 8.9–12.7)
POTASSIUM SERPL-SCNC: 4 MMOL/L (ref 3.5–5.3)
PROT SERPL-MCNC: 7.1 G/DL (ref 6.4–8.4)
RBC # BLD AUTO: 4.55 MILLION/UL (ref 3.81–5.12)
SODIUM SERPL-SCNC: 139 MMOL/L (ref 135–147)
T4 FREE SERPL-MCNC: 1.06 NG/DL (ref 0.76–1.46)
TRIGL SERPL-MCNC: 162 MG/DL
TSH SERPL DL<=0.05 MIU/L-ACNC: 1.76 UIU/ML (ref 0.45–4.5)
VIT B12 SERPL-MCNC: 373 PG/ML (ref 100–900)
WBC # BLD AUTO: 4.02 THOUSAND/UL (ref 4.31–10.16)

## 2022-09-08 PROCEDURE — 82306 VITAMIN D 25 HYDROXY: CPT | Performed by: INTERNAL MEDICINE

## 2022-09-08 PROCEDURE — 83036 HEMOGLOBIN GLYCOSYLATED A1C: CPT | Performed by: INTERNAL MEDICINE

## 2022-09-08 PROCEDURE — 84443 ASSAY THYROID STIM HORMONE: CPT | Performed by: INTERNAL MEDICINE

## 2022-09-08 PROCEDURE — 80061 LIPID PANEL: CPT | Performed by: INTERNAL MEDICINE

## 2022-09-08 PROCEDURE — 82607 VITAMIN B-12: CPT | Performed by: INTERNAL MEDICINE

## 2022-09-08 PROCEDURE — 80053 COMPREHEN METABOLIC PANEL: CPT | Performed by: INTERNAL MEDICINE

## 2022-09-08 PROCEDURE — 85025 COMPLETE CBC W/AUTO DIFF WBC: CPT | Performed by: INTERNAL MEDICINE

## 2022-09-08 PROCEDURE — 84439 ASSAY OF FREE THYROXINE: CPT | Performed by: INTERNAL MEDICINE

## 2022-09-13 ENCOUNTER — APPOINTMENT (OUTPATIENT)
Dept: RADIOLOGY | Facility: CLINIC | Age: 67
End: 2022-09-13
Payer: MEDICARE

## 2022-09-13 ENCOUNTER — OFFICE VISIT (OUTPATIENT)
Dept: OBGYN CLINIC | Facility: CLINIC | Age: 67
End: 2022-09-13

## 2022-09-13 VITALS — WEIGHT: 178 LBS | BODY MASS INDEX: 30.39 KG/M2 | HEIGHT: 64 IN

## 2022-09-13 DIAGNOSIS — S42.301D CLOSED FRACTURE OF SHAFT OF RIGHT HUMERUS WITH ROUTINE HEALING, UNSPECIFIED FRACTURE MORPHOLOGY, SUBSEQUENT ENCOUNTER: ICD-10-CM

## 2022-09-13 DIAGNOSIS — S42.301D CLOSED FRACTURE OF SHAFT OF RIGHT HUMERUS WITH ROUTINE HEALING, UNSPECIFIED FRACTURE MORPHOLOGY, SUBSEQUENT ENCOUNTER: Primary | ICD-10-CM

## 2022-09-13 PROCEDURE — 73060 X-RAY EXAM OF HUMERUS: CPT

## 2022-09-13 PROCEDURE — 99024 POSTOP FOLLOW-UP VISIT: CPT | Performed by: ORTHOPAEDIC SURGERY

## 2022-09-13 NOTE — PROGRESS NOTES
Assessment:   Diagnosis ICD-10-CM Associated Orders   1  Closed fracture of shaft of right humerus with routine healing, unspecified fracture morphology, subsequent encounter  S42 301D XR humerus right       Plan:  Reviewed today's physical exam findings and x-ray findings with patient at time of visit  X-Ray of right humerus taken on 09/13/2022 were reviewed and showed routine healing of the humeral shaft fracture with no significant change in alignment  She can discontinue use of humeral fracture brace  She may begin to use her right arm for ADL  She can continue NSAIDs/Tylenol as needed for pain and soreness  She will be seen for follow-up in 2 months for re-evaluation and repeat x-rays  Patient expresses understanding and is in agreement with this treatment plan  The patient was given the opportunity to ask questions or present concerns  There is increased callus formation present  There is no pain  The brace can be discontinued  Follow-up in 2 months for re-evaluation with new x-rays of her of right humerus-two views  If her condition changes, she will not hesitate to let us now    To do next visit:  Return in about 2 months (around 11/13/2022) for Recheck and repeat x-rays  The above stated was discussed in layman's terms and the patient expressed understanding  All questions were answered to the patient's satisfaction  Scribe Attestation    I,:  Adriana Davison am acting as a scribe while in the presence of the attending physician :       I,:  David Cox, DO personally performed the services described in this documentation    as scribed in my presence :             Subjective:   Megan Abad is a 78 y o  female who presents today for a follow-up evaluation of her right humerus  Patient is about 13 weeks s/p right humeral shaft fracture sustained on 06/13/2022  Patient is doing well with minimal complaints of pain   She denies any recent bruising, numbness, tingling, or feelings of instability  She also denies any fevers, chills or shortness of breath  Review of systems negative unless otherwise specified in HPI  Review of Systems   Constitutional: Negative for chills and fever  HENT: Negative for ear pain and sore throat  Eyes: Negative for pain and visual disturbance  Respiratory: Negative for cough and shortness of breath  Cardiovascular: Negative for chest pain and palpitations  Gastrointestinal: Negative for abdominal pain and vomiting  Genitourinary: Negative for dysuria and hematuria  Musculoskeletal: Negative for arthralgias and back pain  Skin: Negative for color change and rash  Neurological: Negative for seizures and syncope  All other systems reviewed and are negative  Past Medical History:   Diagnosis Date    Anxiety     [facility] disorder ([facility] )     Dementia ([place] [address] )     Disease of thyroid gland     Psychiatric illness     Psychosis ([place] [address] )     Schizoaffective disorder ([place] [address] )     Suicide attempt ([place] [address] )        History reviewed  No pertinent surgical history      Family History   Problem Relation Age of Onset    Emphysema Mother     Cancer Father     Diabetes Neg Hx     Heart disease Neg Hx        Social History     Occupational History    Occupation: SSI disability   Tobacco Use    Smoking status: Never Smoker    Smokeless tobacco: Never Used    Tobacco comment: pt denies smoking   Vaping Use    Vaping Use: Never used   Substance and Sexual Activity    Alcohol use: No    Drug use: No    Sexual activity: Never         Current Outpatient Medications:     ibuprofen (MOTRIN) 400 mg tablet, Take 1 tablet (400 mg total) by mouth every 6 (six) hours as needed for mild pain, Disp: 30 tablet, Rfl: 0    levothyroxine 50 mcg tablet, Take 1 tablet (50 mcg total) by mouth daily in the early morning, Disp: 30 tablet, Rfl: 0    midodrine (PROAMATINE) 5 mg tablet, Take 5 mg by mouth daily, Disp: , Rfl:     mirtazapine (REMERON) 15 mg tablet, Take 15 mg by mouth daily at bedtime, Disp: , Rfl:     risperiDONE (RisperDAL) 1 mg tablet, Take 1 mg by mouth 2 (two) times a day, Disp: , Rfl:     asenapine (SAPHRIS) 10 mg SL tablet, Place 1 tablet (10 mg total) under the tongue 2 (two) times a day for 30 days, Disp: 60 tablet, Rfl: 0    benztropine (COGENTIN) 0 5 mg tablet, Take 1 tablet (0 5 mg total) by mouth 2 (two) times a day (Patient not taking: No sig reported), Disp: 60 tablet, Rfl: 0    haloperidol (HALDOL) 5 mg tablet, Take 1 tablet (5 mg total) by mouth daily at bedtime (Patient not taking: No sig reported), Disp: 30 tablet, Rfl: 0    potassium chloride (K-DUR,KLOR-CON) 20 mEq tablet, Take 1 tablet (20 mEq total) by mouth daily for 30 days, Disp: 30 tablet, Rfl: 0    sertraline (ZOLOFT) 100 mg tablet, Take 1 tablet (100 mg total) by mouth daily for 30 days, Disp: 30 tablet, Rfl: 0    traZODone (DESYREL) 100 mg tablet, Take 1 tablet (100 mg total) by mouth daily at bedtime for 30 days, Disp: 30 tablet, Rfl: 0    Allergies   Allergen Reactions    Codeine     Sulfa Antibiotics     Tylenol [Acetaminophen]           Vitals:       Objective:                    Ortho Exam  right Shoulder -   Skin is warm and dry to touch with no signs of erythema, ecchymosis, or infection  Mild soft tissue swelling or effusion noted  Slight TTP over humeral shaft but improving  ROM: deferred  MMT: deferred  Demonstrates normal elbow, wrist, and finger motion  2+  distal radial pulse with brisk capillary refill to the fingers  Radial, median, ulnar and motor  and sensory distribution intact  Sensation to light touch intact distally     Diagnostics, reviewed and taken today if performed as documented:     The attending physician has personally reviewed the pertinent films in PACS and interpretation is as follows:    X-Ray of right humerus taken on 09/13/2022 were reviewed and showed routine healing of the humeral shaft fracture with no significant change in alignment  Procedures, if performed today:    None performed    Portions of the record may have been created with voice recognition software  Occasional wrong word or "sound a like" substitutions may have occurred due to the inherent limitations of voice recognition software  Read the chart carefully and recognize, using context, where substitutions have occurred

## 2022-09-13 NOTE — PATIENT INSTRUCTIONS
Diagnosis ICD-10-CM Associated Orders   1  Closed fracture of shaft of right humerus with routine healing, unspecified fracture morphology, subsequent encounter  S42 301D XR humerus right      No follow-ups on file

## 2023-02-17 ENCOUNTER — LAB REQUISITION (OUTPATIENT)
Dept: LAB | Facility: HOSPITAL | Age: 68
End: 2023-02-17

## 2023-02-17 DIAGNOSIS — Z03.818 ENCOUNTER FOR OBSERVATION FOR SUSPECTED EXPOSURE TO OTHER BIOLOGICAL AGENTS RULED OUT: ICD-10-CM

## 2023-02-17 LAB — SARS-COV-2 RNA RESP QL NAA+PROBE: NEGATIVE

## 2023-05-11 ENCOUNTER — TELEPHONE (OUTPATIENT)
Dept: GASTROENTEROLOGY | Facility: CLINIC | Age: 68
End: 2023-05-11

## 2023-05-11 NOTE — TELEPHONE ENCOUNTER
Patients GI provider:  LEE Maxwell    Number to return call: [phone]    Reason for call: Nursing home to reschedule appointment no available appointments to schedule please  review thank you     Scheduled procedure/appointment date if applicable: n/a

## 2023-09-10 NOTE — PROGRESS NOTES
Pt calm, cooperative with care  More spontaneous in conversation  Brighter affect  Appears to be somewhat anxious/restless, but denies same  Admits to continued AH, but denies all other s/s  None

## 2023-10-27 ENCOUNTER — LAB REQUISITION (OUTPATIENT)
Dept: LAB | Facility: HOSPITAL | Age: 68
End: 2023-10-27
Payer: MEDICARE

## 2023-10-27 DIAGNOSIS — F25.0 SCHIZOAFFECTIVE DISORDER, BIPOLAR TYPE (HCC): ICD-10-CM

## 2023-10-27 LAB
CHOLEST SERPL-MCNC: 252 MG/DL
EST. AVERAGE GLUCOSE BLD GHB EST-MCNC: 108 MG/DL
HBA1C MFR BLD: 5.4 %
HDLC SERPL-MCNC: 40 MG/DL
LDLC SERPL CALC-MCNC: 173 MG/DL (ref 0–100)
NONHDLC SERPL-MCNC: 212 MG/DL
TRIGL SERPL-MCNC: 197 MG/DL

## 2023-10-27 PROCEDURE — 80061 LIPID PANEL: CPT | Performed by: NURSE PRACTITIONER

## 2023-10-27 PROCEDURE — 83036 HEMOGLOBIN GLYCOSYLATED A1C: CPT | Performed by: NURSE PRACTITIONER

## 2024-05-24 ENCOUNTER — LAB REQUISITION (OUTPATIENT)
Dept: LAB | Facility: HOSPITAL | Age: 69
End: 2024-05-24
Payer: MEDICARE

## 2024-05-24 DIAGNOSIS — I10 ESSENTIAL (PRIMARY) HYPERTENSION: ICD-10-CM

## 2024-05-24 DIAGNOSIS — R53.83 OTHER FATIGUE: ICD-10-CM

## 2024-05-24 DIAGNOSIS — E78.5 HYPERLIPIDEMIA, UNSPECIFIED: ICD-10-CM

## 2024-05-24 LAB
ALBUMIN SERPL BCP-MCNC: 4.1 G/DL (ref 3.5–5)
ALP SERPL-CCNC: 42 U/L (ref 34–104)
ALT SERPL W P-5'-P-CCNC: 13 U/L (ref 7–52)
ANION GAP SERPL CALCULATED.3IONS-SCNC: 7 MMOL/L (ref 4–13)
AST SERPL W P-5'-P-CCNC: 18 U/L (ref 13–39)
BILIRUB SERPL-MCNC: 0.56 MG/DL (ref 0.2–1)
BUN SERPL-MCNC: 10 MG/DL (ref 5–25)
CALCIUM SERPL-MCNC: 8.7 MG/DL (ref 8.4–10.2)
CHLORIDE SERPL-SCNC: 104 MMOL/L (ref 96–108)
CHOLEST SERPL-MCNC: 243 MG/DL
CO2 SERPL-SCNC: 30 MMOL/L (ref 21–32)
CREAT SERPL-MCNC: 0.7 MG/DL (ref 0.6–1.3)
GFR SERPL CREATININE-BSD FRML MDRD: 89 ML/MIN/1.73SQ M
GLUCOSE SERPL-MCNC: 99 MG/DL (ref 65–140)
HDLC SERPL-MCNC: 47 MG/DL
LDLC SERPL CALC-MCNC: 168 MG/DL (ref 0–100)
NONHDLC SERPL-MCNC: 196 MG/DL
POTASSIUM SERPL-SCNC: 3.7 MMOL/L (ref 3.5–5.3)
PROT SERPL-MCNC: 6.8 G/DL (ref 6.4–8.4)
SODIUM SERPL-SCNC: 141 MMOL/L (ref 135–147)
TRIGL SERPL-MCNC: 141 MG/DL
TSH SERPL DL<=0.05 MIU/L-ACNC: 2.66 UIU/ML (ref 0.45–4.5)

## 2024-05-24 PROCEDURE — 80061 LIPID PANEL: CPT | Performed by: NURSE PRACTITIONER

## 2024-05-24 PROCEDURE — 84443 ASSAY THYROID STIM HORMONE: CPT | Performed by: NURSE PRACTITIONER

## 2024-05-24 PROCEDURE — 80053 COMPREHEN METABOLIC PANEL: CPT | Performed by: NURSE PRACTITIONER

## 2025-06-19 NOTE — PLAN OF CARE
Is This A New Presentation, Or A Follow-Up?: Follow Up Actinic Keratoses Problem: Ineffective Coping  Goal: Participates in unit activities  Interventions:  - Provide therapeutic environment   - Provide required programming   - Redirect inappropriate behaviors    Outcome: Progressing  Pt cooperatively quiet in groups  She has not been speaking up unless called upon yet is consistently topic focused